# Patient Record
Sex: FEMALE | Race: ASIAN | Employment: UNEMPLOYED | URBAN - METROPOLITAN AREA
[De-identification: names, ages, dates, MRNs, and addresses within clinical notes are randomized per-mention and may not be internally consistent; named-entity substitution may affect disease eponyms.]

---

## 2018-02-10 ENCOUNTER — HOSPITAL ENCOUNTER (EMERGENCY)
Facility: HOSPITAL | Age: 43
Discharge: HOME | End: 2018-02-10
Attending: EMERGENCY MEDICINE
Payer: COMMERCIAL

## 2018-02-10 VITALS
BODY MASS INDEX: 30.51 KG/M2 | HEART RATE: 91 BPM | DIASTOLIC BLOOD PRESSURE: 98 MMHG | TEMPERATURE: 97 F | HEIGHT: 69 IN | OXYGEN SATURATION: 99 % | WEIGHT: 206 LBS | RESPIRATION RATE: 16 BRPM | SYSTOLIC BLOOD PRESSURE: 128 MMHG
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
## 2018-02-10 PROCEDURE — 99283 EMERGENCY DEPT VISIT LOW MDM: CPT

## 2018-02-10 RX ORDER — AMOXICILLIN AND CLAVULANATE POTASSIUM 875; 125 MG/1; MG/1
1 TABLET, FILM COATED ORAL 2 TIMES DAILY
Qty: 14 TABLET | Refills: 0 | Status: SHIPPED | OUTPATIENT
Start: 2018-02-10 | End: 2018-02-17

## 2018-02-11 NOTE — ED PROVIDER NOTES
Patient Seen in: [facility] Emergency Department    History   Patient presents with:  Bite (integumentary)    Stated Complaint: dog bite to lip    HPI    42-year-old female here concerned about the dog bite to the lip today.   She recently bought a p pulses. Pulmonary/Chest: Effort normal and breath sounds normal. No stridor. Abdominal: Soft. Bowel sounds are normal.   Neurological: Pt is alert and oriented to person, place, and time. Pt has normal reflexes. Skin: Skin is warm and dry.   There is

## 2018-02-11 NOTE — ED INITIAL ASSESSMENT (HPI)
Going for the string dangling from her sweatshirt, the pt's puppy missed, accidentally biting onto her bottom lip where the bleeding has since resolved. But the pt is concerned because the puppy hasn't received all of its shots yet.

## 2019-05-02 ENCOUNTER — TELEPHONE (OUTPATIENT)
Dept: INTERNAL MEDICINE CLINIC | Facility: CLINIC | Age: 44
End: 2019-05-02

## 2019-05-02 ENCOUNTER — OFFICE VISIT (OUTPATIENT)
Dept: INTERNAL MEDICINE CLINIC | Facility: CLINIC | Age: 44
End: 2019-05-02
Payer: COMMERCIAL

## 2019-05-02 VITALS
HEIGHT: 69 IN | DIASTOLIC BLOOD PRESSURE: 80 MMHG | TEMPERATURE: 98 F | WEIGHT: 209 LBS | HEART RATE: 84 BPM | BODY MASS INDEX: 30.96 KG/M2 | SYSTOLIC BLOOD PRESSURE: 122 MMHG

## 2019-05-02 DIAGNOSIS — IMO0001 UNCONTROLLED INSULIN DEPENDENT DIABETES MELLITUS: Primary | ICD-10-CM

## 2019-05-02 PROCEDURE — 99213 OFFICE O/P EST LOW 20 MIN: CPT | Performed by: INTERNAL MEDICINE

## 2019-05-02 NOTE — TELEPHONE ENCOUNTER
Spoke to pt to call Lakshmi at [facility] to give verbal release of DM Eye Exam to be faxed to AS for his review, faxed request also. Pt verbalizes understanding.

## 2019-05-02 NOTE — TELEPHONE ENCOUNTER
Message   Received:  Today   Message Contents   Carroll Huertas MD  [facility] Clinical Staff             [facility]

## 2019-05-03 ENCOUNTER — TELEPHONE (OUTPATIENT)
Dept: INTERNAL MEDICINE CLINIC | Facility: CLINIC | Age: 44
End: 2019-05-03

## 2019-05-03 NOTE — TELEPHONE ENCOUNTER
Patient states she is coming in to the lab here our office at about 9am, scheduled appt with Joey PAULINO for 5/14/19 at 1:30pm.  Pt verbalizes understanding. FYI to AS.

## 2019-05-03 NOTE — PROGRESS NOTES
Patient presents with:  Establish Care: LG. Room 8. Diabetes follow up. Last hga1c was about almost a year ago and it was 12.1      HPI:  Here for establish care, non commpliant diabetic, last a1c 12.  Here for establish care, states ready to control sugars visual inspection, pedal pulses present    A/P:    Uncontrolled insulin dependent diabetes mellitus (hcc)  (primary encounter diagnosis)  Had eye exam and [facility].    Referred to dm ed and fu with polly or ghada  Pt did not do labs yesterday as she s

## 2019-05-03 NOTE — TELEPHONE ENCOUNTER
Message   Received: Today   Message Contents   Nadine Mcdermott MD  [facility] Clinical Staff             Pt did not do labs, call her and make sure she folows up with that and with dm clinic.  We will only fill insulin month to month for her, historically no

## 2019-05-04 ENCOUNTER — LAB ENCOUNTER (OUTPATIENT)
Dept: LAB | Age: 44
End: 2019-05-04
Attending: INTERNAL MEDICINE
Payer: COMMERCIAL

## 2019-05-04 DIAGNOSIS — IMO0001 UNCONTROLLED INSULIN DEPENDENT DIABETES MELLITUS: ICD-10-CM

## 2019-05-04 PROCEDURE — 80053 COMPREHEN METABOLIC PANEL: CPT

## 2019-05-04 PROCEDURE — 80061 LIPID PANEL: CPT

## 2019-05-04 PROCEDURE — 84439 ASSAY OF FREE THYROXINE: CPT

## 2019-05-04 PROCEDURE — 84443 ASSAY THYROID STIM HORMONE: CPT

## 2019-05-04 PROCEDURE — 85025 COMPLETE CBC W/AUTO DIFF WBC: CPT

## 2019-05-04 PROCEDURE — 83036 HEMOGLOBIN GLYCOSYLATED A1C: CPT

## 2019-05-14 ENCOUNTER — OFFICE VISIT (OUTPATIENT)
Dept: ENDOCRINOLOGY CLINIC | Facility: CLINIC | Age: 44
End: 2019-05-14
Payer: COMMERCIAL

## 2019-05-14 VITALS
HEIGHT: 69 IN | HEART RATE: 86 BPM | RESPIRATION RATE: 20 BRPM | SYSTOLIC BLOOD PRESSURE: 120 MMHG | BODY MASS INDEX: 31.25 KG/M2 | WEIGHT: 211 LBS | DIASTOLIC BLOOD PRESSURE: 88 MMHG

## 2019-05-14 DIAGNOSIS — IMO0001 UNCONTROLLED INSULIN DEPENDENT DIABETES MELLITUS: Primary | ICD-10-CM

## 2019-05-14 PROBLEM — E78.5 HYPERLIPIDEMIA: Status: ACTIVE | Noted: 2017-12-06

## 2019-05-14 PROBLEM — L71.9 ACNE ROSACEA: Status: ACTIVE | Noted: 2019-05-14

## 2019-05-14 PROBLEM — I10 HYPERTENSION: Status: ACTIVE | Noted: 2019-05-14

## 2019-05-14 PROCEDURE — 95250 CONT GLUC MNTR PHYS/QHP EQP: CPT | Performed by: NURSE PRACTITIONER

## 2019-05-14 PROCEDURE — 99214 OFFICE O/P EST MOD 30 MIN: CPT | Performed by: NURSE PRACTITIONER

## 2019-05-14 RX ORDER — METFORMIN HYDROCHLORIDE 500 MG/1
500 TABLET, EXTENDED RELEASE ORAL DAILY
Qty: 120 TABLET | Refills: 1 | Status: SHIPPED | OUTPATIENT
Start: 2019-05-14 | End: 2020-12-03

## 2019-05-14 NOTE — ASSESSMENT & PLAN NOTE
5- 2019 labs per PCP  Not on statin   Per ADA, recommend statin rx : 4.2% 10 year CAD risk   Will discuss at follow up

## 2019-05-14 NOTE — PATIENT INSTRUCTIONS
We are here to support you with Diabetes but please remember that you still need your primary care doctor for your routine health maintenance.    Your A1C: 12.9%   This is too high for you and we will work with  You on improving your health   This test prov anytime you develop nausea, vomiting or diarrhea : you can decrease back down on dose or call me to discuss symptoms     Metformin increases your insulin sensitivity which means it will help the insulin in your body work more effectively.    It is very diff want to try the device: let me know     Always bring your glucose meter or blood sugar logbook to every appointment here at the diabetes center. This allows me to safely make adjustments to your diabetes plan.    In order for me to determine any patterns i

## 2019-05-14 NOTE — PROGRESS NOTES
Kate Crow is a 44year old female who presents today to establish for insulin dependent diabetes management.    Primary care physician: Jose Ramos MD   She presents today after being referred due to poorly controlled DM over past 3m per A1c 12.9% no  Nephropathy: not screened     Macrovascular:  PVD: no  CAD: no  Stroke/CVA: no    Modifying factors:  Medication adherence: yes but recently   Nutrition  Does not carb control. Has sweet tooth.     Exercise: 10 min daily   Recent steroids, illness or in Eyes: Negative for visual disturbance. Respiratory: Positive for shortness of breath. W exertion    Cardiovascular: Negative for chest pain. Gastrointestinal: Negative for nausea, abdominal pain and constipation.    Endocrine: Positive for irina glucose control to prevent onset /progression of DM complications.    Discussed with patient that the risk of developing irreversible microvascular complications (blindness, kidney problems, nerve problems, etc) is 15% higher  for every 1% increment A1c >7% loss discussed. Reviewed hypoglycemia signs/symptoms, treatment using the Rule of 15' and when to call DM center .   Continue site rotation to avoid lipodystrophy     The patient is asked to return in 1-2 weeks but recommended to contact DM clinic sooner i spent counseling and coordinating care related to their office visit.

## 2019-05-14 NOTE — ASSESSMENT & PLAN NOTE
A1C :   Lab Results   Component Value Date     (H) 05/04/2019    A1C 12.9 (H) 05/04/2019     Weight: 211 lb   Diabetes control is poor .   Recommendations:   Continue Lantus 30 units once daily   NOVOLOG:   If carb controlling, decrease to 10 Breakfa .  Continue site rotation to avoid lipodystrophy     The patient is asked to return in 1-2 weeks but recommended to contact DM clinic sooner if questions or concerns. The patient indicates understanding of these issues and agrees to the plan.

## 2019-05-16 ENCOUNTER — TELEPHONE (OUTPATIENT)
Dept: INTERNAL MEDICINE CLINIC | Facility: CLINIC | Age: 44
End: 2019-05-16

## 2019-05-17 NOTE — TELEPHONE ENCOUNTER
She did leave a specimen in lab bathroom before she left. Urine cup had her name and date of birth on it.    Can we confirm tami benito or Noble Croft

## 2019-05-17 NOTE — TELEPHONE ENCOUNTER
D/w CAB. [id number] Lavonne Zuniga for pt to give another urine sample at next OV on 5/28/19. Called and apologized to the pt as the specimen was likely misplaced. She understands and will complete at next appt. Pt had questions for AS re: lipids too.     AS- Pt is asking re: l

## 2019-05-21 NOTE — TELEPHONE ENCOUNTER
Called pt back, scheduled f/u appt with AS to discuss lipid results for 5/28 at 4:30 pm per pt request.  Pt verbalized understanding.

## 2019-05-30 ENCOUNTER — OFFICE VISIT (OUTPATIENT)
Dept: INTERNAL MEDICINE CLINIC | Facility: CLINIC | Age: 44
End: 2019-05-30
Payer: COMMERCIAL

## 2019-05-30 VITALS
WEIGHT: 213.19 LBS | RESPIRATION RATE: 16 BRPM | SYSTOLIC BLOOD PRESSURE: 124 MMHG | BODY MASS INDEX: 31.58 KG/M2 | DIASTOLIC BLOOD PRESSURE: 78 MMHG | HEART RATE: 96 BPM | HEIGHT: 69 IN

## 2019-05-30 DIAGNOSIS — Z12.31 VISIT FOR SCREENING MAMMOGRAM: ICD-10-CM

## 2019-05-30 DIAGNOSIS — I10 ESSENTIAL HYPERTENSION: ICD-10-CM

## 2019-05-30 DIAGNOSIS — E78.00 PURE HYPERCHOLESTEROLEMIA: ICD-10-CM

## 2019-05-30 DIAGNOSIS — IMO0001 UNCONTROLLED INSULIN DEPENDENT DIABETES MELLITUS: Primary | ICD-10-CM

## 2019-05-30 PROCEDURE — 99214 OFFICE O/P EST MOD 30 MIN: CPT | Performed by: INTERNAL MEDICINE

## 2019-05-30 RX ORDER — ATORVASTATIN CALCIUM 20 MG/1
20 TABLET, FILM COATED ORAL NIGHTLY
Qty: 90 TABLET | Refills: 1 | Status: SHIPPED | OUTPATIENT
Start: 2019-05-30 | End: 2020-03-17 | Stop reason: DRUGHIGH

## 2019-05-31 NOTE — PROGRESS NOTES
Patient presents with: Follow - Up: cn room 9: discuss test results       HPI:  Here for f/u htn, high chol, start statin today. Pt states cgms fell off, barely checking sugars, but down in 200's now. Notes mild nausea with victoza. bp stable on meds. gallops. Pulmonary/Chest: Effort normal and breath sounds normal. No respiratory distress. Abdominal: Soft. Bowel sounds are normal. Non tender, no masses, no organomegaly or hernias.   Musculoskeletal: No edema  Diabetic foot exam: Normal barefoot bilat

## 2019-06-19 ENCOUNTER — MED REC SCAN ONLY (OUTPATIENT)
Dept: INTERNAL MEDICINE CLINIC | Facility: CLINIC | Age: 44
End: 2019-06-19

## 2019-07-31 ENCOUNTER — OFFICE VISIT (OUTPATIENT)
Dept: ENDOCRINOLOGY CLINIC | Facility: CLINIC | Age: 44
End: 2019-07-31
Payer: COMMERCIAL

## 2019-07-31 VITALS
DIASTOLIC BLOOD PRESSURE: 78 MMHG | HEART RATE: 104 BPM | HEIGHT: 69 IN | WEIGHT: 217 LBS | SYSTOLIC BLOOD PRESSURE: 114 MMHG | BODY MASS INDEX: 32.14 KG/M2

## 2019-07-31 DIAGNOSIS — IMO0001 UNCONTROLLED INSULIN DEPENDENT DIABETES MELLITUS: Primary | ICD-10-CM

## 2019-07-31 DIAGNOSIS — E78.5 HYPERLIPIDEMIA, UNSPECIFIED HYPERLIPIDEMIA TYPE: ICD-10-CM

## 2019-07-31 PROBLEM — Z00.00 HEALTHCARE MAINTENANCE: Status: ACTIVE | Noted: 2019-07-31

## 2019-07-31 LAB
CARTRIDGE LOT#: 219 NUMERIC
GLUCOSE BLOOD: 464
HEMOGLOBIN A1C: 11 % (ref 4.3–5.6)
TEST STRIP LOT #: NORMAL NUMERIC

## 2019-07-31 PROCEDURE — 83036 HEMOGLOBIN GLYCOSYLATED A1C: CPT | Performed by: NURSE PRACTITIONER

## 2019-07-31 PROCEDURE — 82962 GLUCOSE BLOOD TEST: CPT | Performed by: NURSE PRACTITIONER

## 2019-07-31 PROCEDURE — 99214 OFFICE O/P EST MOD 30 MIN: CPT | Performed by: NURSE PRACTITIONER

## 2019-07-31 RX ORDER — BLOOD-GLUCOSE TRANSMITTER
EACH MISCELLANEOUS
Qty: 1 EACH | Refills: 3 | Status: SHIPPED | OUTPATIENT
Start: 2019-07-31 | End: 2020-10-02 | Stop reason: ALTCHOICE

## 2019-07-31 RX ORDER — BLOOD-GLUCOSE SENSOR
1 EACH MISCELLANEOUS
Qty: 3 EACH | Refills: 12 | Status: SHIPPED | OUTPATIENT
Start: 2019-07-31 | End: 2020-10-02 | Stop reason: ALTCHOICE

## 2019-07-31 RX ORDER — DOXYCYCLINE HYCLATE 100 MG/1
CAPSULE ORAL
COMMUNITY
Start: 2019-07-18

## 2019-07-31 RX ORDER — BLOOD-GLUCOSE,RECEIVER,CONT
1 EACH MISCELLANEOUS ONCE
Qty: 1 DEVICE | Refills: 0 | Status: SHIPPED | OUTPATIENT
Start: 2019-07-31 | End: 2019-07-31

## 2019-07-31 NOTE — PATIENT INSTRUCTIONS
A1C 11.0%   This is down slightly from 12.9%  We will continue to work on medication adjustments to improve your health      Continue :  Metformin XR 500m tab twice daily  Try increasing to 2 tabs in AM , 1 tab in PM   After your antibiotics, increase iPhone 7 and later (running iOS 11 and later)  FreeStyle LibreLink nathan is currently not available for Android users in the [address] [place] [address].     If you are uploading your reader with a cable at home or using the [employer] nathan, You will have the ability to share your Libr

## 2019-07-31 NOTE — PROGRESS NOTES
Asuncion Yen is a 38year old female who presents today to establish for insulin dependent diabetes management.    Primary care physician: Piper Chandler MD   In the past 3m her DM control has slightly improved to 11.0%  ( last A1C 12.9%) but still remai GFRNAA 94 05/04/2019    GFRAA 108 05/04/2019     Component      Latest Ref Rng & Units 5/30/2019   POC Albumin, UR      0 - 19 MG/DL 10   POC Creatinine,UR      10 - 300 MG/DL 50   POC ALBUMIN/CREAT RATIO, UR      0 - 29 MG/G <30       SMBG patterns: me Tablet 24 Hr Take 1 tablet (500 mg total) by mouth daily.  Disp: 120 tablet Rfl: 1   Liraglutide (VICTOZA) 18 MG/3ML Subcutaneous Solution Pen-injector Inject 1.8mg once daily Disp: 9 mL Rfl: 1   insulin glargine (LANTUS SOLOSTAR) 100 UNIT/ML Subcutaneous S 2019 labs per PCP  Taking Atorvastatin rx   Update labs       Uncontrolled insulin dependent diabetes mellitus ([facility] [address]  A1C : 11.0% (down from 12.9%)   Weight: 217 lb (last weight: 211 lb)   Diabetes control is poor .   Again reminded about poor glycemic cont as above  Lipids: update labs; started on atorvastatin rx   Dentist: recommended every 6 months      The risks and benefits of my recommendations, as well as other treatment options were discussed with the patient today.  questions were also answered to the

## 2019-09-09 ENCOUNTER — TELEPHONE (OUTPATIENT)
Dept: ENDOCRINOLOGY CLINIC | Facility: CLINIC | Age: 44
End: 2019-09-09

## 2019-09-23 ENCOUNTER — OFFICE VISIT (OUTPATIENT)
Dept: ENDOCRINOLOGY CLINIC | Facility: CLINIC | Age: 44
End: 2019-09-23
Payer: COMMERCIAL

## 2019-09-23 ENCOUNTER — TELEPHONE (OUTPATIENT)
Dept: INTERNAL MEDICINE CLINIC | Facility: CLINIC | Age: 44
End: 2019-09-23

## 2019-09-23 VITALS
BODY MASS INDEX: 31.99 KG/M2 | WEIGHT: 216 LBS | SYSTOLIC BLOOD PRESSURE: 128 MMHG | HEART RATE: 90 BPM | HEIGHT: 69 IN | RESPIRATION RATE: 20 BRPM | DIASTOLIC BLOOD PRESSURE: 88 MMHG

## 2019-09-23 DIAGNOSIS — IMO0001 UNCONTROLLED INSULIN DEPENDENT DIABETES MELLITUS: Primary | ICD-10-CM

## 2019-09-23 LAB
GLUCOSE BLOOD: 334
TEST STRIP LOT #: NORMAL NUMERIC

## 2019-09-23 PROCEDURE — 82962 GLUCOSE BLOOD TEST: CPT | Performed by: NURSE PRACTITIONER

## 2019-09-23 PROCEDURE — 99214 OFFICE O/P EST MOD 30 MIN: CPT | Performed by: NURSE PRACTITIONER

## 2019-09-23 RX ORDER — FLASH GLUCOSE SENSOR
KIT MISCELLANEOUS
Qty: 2 EACH | Refills: 1 | Status: SHIPPED | OUTPATIENT
Start: 2019-09-23 | End: 2020-05-20

## 2019-09-23 RX ORDER — FLASH GLUCOSE SENSOR
KIT MISCELLANEOUS
Qty: 1 DEVICE | Refills: 0 | Status: SHIPPED | OUTPATIENT
Start: 2019-09-23

## 2019-09-23 NOTE — PROGRESS NOTES
Kate Crow is a 38year old female who presents today for diabetes management.    Primary care physician: Jose Ramos MD   In the past 6 weeks her BG control has remained uncontrolled  BG today is 334 mg/dl   At last visit, basal insulin was resumed symptoms:   Polyuria, polyphagia, polydipsia: no  Paresthesias: no  Blurred vision: no  Hypoglycemia:no     DM Complications:  Microvascular:   Neuropathy: no  Retinopathy: no  Nephropathy: neg urine m/alb  5-2019     Macrovascular:  PVD: no  CAD: no  Dimple Benton Solution Pen-injector 30 units once daily Disp: 30 mL Rfl: 1   Insulin Aspart Pen (NOVOLOG FLEXPEN) 100 UNIT/ML Subcutaneous Solution Pen-injector 50 units daily as directed (Patient taking differently: 20 units twice daily as directed ) Disp: 15 mL Rfl: 1 person, place, and time. She appears well-developed and well-nourished. No distress. Cardiovascular: Normal rate. Pulmonary/Chest: Effort normal.   Neurological: She is alert and oriented to person, place, and time.    Skin: + face rash   Psychiatric: reported above  Retinopathy Screen: No DR 5- (in media )   Nephropathy Screen:  NEG 5-2019    Depression Screen:  PHQ-2 NEG 5-3-2019   Feet exam: Reviewed feet precautions 5-2-2019  BP: as above  Lipids: update labs; started on atorvastatin rx   Bonnita Coffee

## 2019-09-23 NOTE — PATIENT INSTRUCTIONS
Your trends are still too high       Tresiba  Increase to 34 units once daily     NOVOLO units at Breakfast  26  w dinner meal     Victoza :  Try to increase dose to 1.2mg once daily     Metformin XR 500m tab twice daily    If your sugar is highe

## 2019-09-24 NOTE — TELEPHONE ENCOUNTER
[employer] back and said. ... I looked into the account here with us and it seems that we have made attempts to get ahold of this patient on 8/1/2019 as well as on 8/21/2019. We have called and sent emails due to the voicemail box being full.  This

## 2019-09-24 NOTE — TELEPHONE ENCOUNTER
Called to check the status of the dexcom per CAB from 7/31/19 stated that not cover thru pharmacy benefits so it was return to dexcom will message alex michelle to check thru his end.

## 2019-10-15 ENCOUNTER — TELEPHONE (OUTPATIENT)
Dept: INTERNAL MEDICINE CLINIC | Facility: CLINIC | Age: 44
End: 2019-10-15

## 2019-10-15 NOTE — TELEPHONE ENCOUNTER
Spoke to pt and scheduled appt   Future Appointments   Date Time Provider Lazaro Taya   11/5/2019  3:15 PM MARTINEZ Bowers [facility]

## 2019-11-11 DIAGNOSIS — IMO0001 UNCONTROLLED INSULIN DEPENDENT DIABETES MELLITUS: ICD-10-CM

## 2019-11-12 RX ORDER — INSULIN LISPRO 100 [IU]/ML
INJECTION, SOLUTION INTRAVENOUS; SUBCUTANEOUS
Refills: 0 | Status: CANCELLED | OUTPATIENT
Start: 2019-11-12

## 2019-11-12 RX ORDER — INSULIN LISPRO 100 [IU]/ML
50 INJECTION, SOLUTION INTRAVENOUS; SUBCUTANEOUS DAILY
Qty: 15 ML | Refills: 0 | Status: SHIPPED | OUTPATIENT
Start: 2019-11-12 | End: 2020-02-05

## 2019-11-21 ENCOUNTER — TELEPHONE (OUTPATIENT)
Dept: INTERNAL MEDICINE CLINIC | Facility: CLINIC | Age: 44
End: 2019-11-21

## 2019-11-21 NOTE — TELEPHONE ENCOUNTER
FW:   Received: Ric Chavez MD  [facility] Clinical Staff Cc: MARTINEZ Evans             Discharge process    Previous Messages      ----- Message -----   From: MARTINEZ Evans   Sent: 11/20/2019   8:14 AM

## 2019-11-22 NOTE — TELEPHONE ENCOUNTER
Spoke with Ally Reynolds and Gary Lynn regarding this patient and they did approve thje dismissal. She has been sent a dismissl letter, one regular mail and one certified.   I tried to call her to let her know she was bring dismissed but her voice mail is

## 2019-12-27 DIAGNOSIS — IMO0001 UNCONTROLLED INSULIN DEPENDENT DIABETES MELLITUS: ICD-10-CM

## 2019-12-27 RX ORDER — INSULIN ASPART 100 [IU]/ML
INJECTION, SOLUTION INTRAVENOUS; SUBCUTANEOUS
Qty: 15 ML | Refills: 1 | OUTPATIENT
Start: 2019-12-27

## 2019-12-27 RX ORDER — INSULIN ASPART 100 [IU]/ML
INJECTION, SOLUTION INTRAVENOUS; SUBCUTANEOUS
Qty: 15 ML | Refills: 0 | OUTPATIENT
Start: 2019-12-27

## 2020-02-05 ENCOUNTER — OFFICE VISIT (OUTPATIENT)
Dept: INTERNAL MEDICINE CLINIC | Facility: CLINIC | Age: 45
End: 2020-02-05
Payer: COMMERCIAL

## 2020-02-05 VITALS
HEART RATE: 72 BPM | BODY MASS INDEX: 32.14 KG/M2 | SYSTOLIC BLOOD PRESSURE: 118 MMHG | HEIGHT: 69 IN | WEIGHT: 217 LBS | DIASTOLIC BLOOD PRESSURE: 72 MMHG | RESPIRATION RATE: 16 BRPM | TEMPERATURE: 98 F

## 2020-02-05 DIAGNOSIS — Z23 NEED FOR INFLUENZA VACCINATION: ICD-10-CM

## 2020-02-05 DIAGNOSIS — IMO0001 UNCONTROLLED INSULIN DEPENDENT DIABETES MELLITUS: Primary | ICD-10-CM

## 2020-02-05 DIAGNOSIS — E78.5 HYPERLIPIDEMIA, UNSPECIFIED HYPERLIPIDEMIA TYPE: ICD-10-CM

## 2020-02-05 DIAGNOSIS — L81.9 HYPERPIGMENTATION OF SKIN: ICD-10-CM

## 2020-02-05 PROCEDURE — 90471 IMMUNIZATION ADMIN: CPT | Performed by: PHYSICIAN ASSISTANT

## 2020-02-05 PROCEDURE — 99214 OFFICE O/P EST MOD 30 MIN: CPT | Performed by: PHYSICIAN ASSISTANT

## 2020-02-05 PROCEDURE — 90686 IIV4 VACC NO PRSV 0.5 ML IM: CPT | Performed by: PHYSICIAN ASSISTANT

## 2020-02-05 RX ORDER — INSULIN ASPART 100 [IU]/ML
INJECTION, SOLUTION INTRAVENOUS; SUBCUTANEOUS
Refills: 0 | COMMUNITY
Start: 2019-11-12 | End: 2020-02-18

## 2020-02-05 NOTE — PROGRESS NOTES
Patient presents with:  Diabetes: DD rm 2, Diabetes Management. Itching Palms, Back. Skin discoloration. PM Fatigue. Liver/Heart bloodwork. HPI:   Pt presents with concern about her diabetes as well as dark discoloration on her back and itchy palms. • atorvastatin 20 MG Oral Tab Take 1 tablet (20 mg total) by mouth nightly. 90 tablet 1   • metFORMIN HCl  MG Oral Tablet 24 Hr Take 1 tablet (500 mg total) by mouth daily.  120 tablet 1   • Liraglutide (VICTOZA) 18 MG/3ML Subcutaneous Solution P today.  Hyperlipidemia, unspecified hyperlipidemia type - Check labs, encouraged compliance. Working on diet.     Orders Placed This Encounter      CMP now      Lipids now      Hemoglobin A1C now      Microalb/Creat Ratio, now      Flulaval 6 months and ol

## 2020-02-12 RX ORDER — INSULIN ASPART 100 [IU]/ML
INJECTION, SOLUTION INTRAVENOUS; SUBCUTANEOUS
Qty: 15 ML | Refills: 0 | OUTPATIENT
Start: 2020-02-12

## 2020-02-18 ENCOUNTER — TELEPHONE (OUTPATIENT)
Dept: INTERNAL MEDICINE CLINIC | Facility: CLINIC | Age: 45
End: 2020-02-18

## 2020-02-18 RX ORDER — INSULIN ASPART 100 [IU]/ML
INJECTION, SOLUTION INTRAVENOUS; SUBCUTANEOUS
Qty: 1 PEN | Refills: 1 | Status: SHIPPED | OUTPATIENT
Start: 2020-02-18 | End: 2020-03-21

## 2020-02-18 NOTE — TELEPHONE ENCOUNTER
Last VISIT 02/05/20    Last REFILL 11/12/19     Last LABS 07/31/19 A1c done    Future Appointments   Date Time Provider Lazaro Bain   3/13/2020  3:00 PM Radha Lieberman MD [facility]         Please Approve or Deny.

## 2020-03-16 NOTE — TELEPHONE ENCOUNTER
Future Appointments   Date Time Provider Lazaro Bain   4/17/2020  9:45 AM Quin Holland MD [facility]     Pt will complete labs, Pt ask the pharmacy for a refill. Will call back at the end of the week to discuss lab results.

## 2020-03-17 ENCOUNTER — TELEPHONE (OUTPATIENT)
Dept: INTERNAL MEDICINE CLINIC | Facility: CLINIC | Age: 45
End: 2020-03-17

## 2020-03-17 ENCOUNTER — LAB ENCOUNTER (OUTPATIENT)
Dept: LAB | Age: 45
End: 2020-03-17
Attending: PHYSICIAN ASSISTANT
Payer: COMMERCIAL

## 2020-03-17 DIAGNOSIS — R10.2 PELVIC PRESSURE IN FEMALE: Primary | ICD-10-CM

## 2020-03-17 DIAGNOSIS — IMO0001 UNCONTROLLED INSULIN DEPENDENT DIABETES MELLITUS: ICD-10-CM

## 2020-03-17 DIAGNOSIS — R10.2 PELVIC PRESSURE IN FEMALE: ICD-10-CM

## 2020-03-17 LAB
ALBUMIN SERPL-MCNC: 3.5 G/DL (ref 3.4–5)
ALBUMIN/GLOB SERPL: 0.8 {RATIO} (ref 1–2)
ALP LIVER SERPL-CCNC: 88 U/L (ref 37–98)
ALT SERPL-CCNC: 30 U/L (ref 13–56)
ANION GAP SERPL CALC-SCNC: 6 MMOL/L (ref 0–18)
AST SERPL-CCNC: 18 U/L (ref 15–37)
BILIRUB SERPL-MCNC: 0.6 MG/DL (ref 0.1–2)
BILIRUB UR QL STRIP.AUTO: NEGATIVE
BUN BLD-MCNC: 10 MG/DL (ref 7–18)
BUN/CREAT SERPL: 11.8 (ref 10–20)
CALCIUM BLD-MCNC: 9 MG/DL (ref 8.5–10.1)
CHLORIDE SERPL-SCNC: 100 MMOL/L (ref 98–112)
CHOLEST SMN-MCNC: 219 MG/DL (ref ?–200)
CLARITY UR REFRACT.AUTO: CLEAR
CO2 SERPL-SCNC: 27 MMOL/L (ref 21–32)
COLOR UR AUTO: YELLOW
CREAT BLD-MCNC: 0.85 MG/DL (ref 0.55–1.02)
CREAT UR-SCNC: 65.1 MG/DL
EST. AVERAGE GLUCOSE BLD GHB EST-MCNC: 361 MG/DL (ref 68–126)
GLOBULIN PLAS-MCNC: 4.4 G/DL (ref 2.8–4.4)
GLUCOSE BLD-MCNC: 333 MG/DL (ref 70–99)
GLUCOSE UR STRIP.AUTO-MCNC: >=500 MG/DL
HBA1C MFR BLD HPLC: 14.2 % (ref ?–5.7)
HDLC SERPL-MCNC: 42 MG/DL (ref 40–59)
KETONES UR STRIP.AUTO-MCNC: NEGATIVE MG/DL
LDLC SERPL CALC-MCNC: 136 MG/DL (ref ?–100)
M PROTEIN MFR SERPL ELPH: 7.9 G/DL (ref 6.4–8.2)
MICROALBUMIN UR-MCNC: 0.56 MG/DL
MICROALBUMIN/CREAT 24H UR-RTO: 8.6 UG/MG (ref ?–30)
NITRITE UR QL STRIP.AUTO: NEGATIVE
NONHDLC SERPL-MCNC: 177 MG/DL (ref ?–130)
OSMOLALITY SERPL CALC.SUM OF ELEC: 288 MOSM/KG (ref 275–295)
PATIENT FASTING Y/N/NP: YES
PATIENT FASTING Y/N/NP: YES
PH UR STRIP.AUTO: 6 [PH] (ref 4.5–8)
POTASSIUM SERPL-SCNC: 3.8 MMOL/L (ref 3.5–5.1)
PROT UR STRIP.AUTO-MCNC: NEGATIVE MG/DL
RBC UR QL AUTO: NEGATIVE
SODIUM SERPL-SCNC: 133 MMOL/L (ref 136–145)
SP GR UR STRIP.AUTO: 1.03 (ref 1–1.03)
TRIGL SERPL-MCNC: 204 MG/DL (ref 30–149)
UROBILINOGEN UR STRIP.AUTO-MCNC: <2 MG/DL
VLDLC SERPL CALC-MCNC: 41 MG/DL (ref 0–30)

## 2020-03-17 PROCEDURE — 81001 URINALYSIS AUTO W/SCOPE: CPT | Performed by: INTERNAL MEDICINE

## 2020-03-17 PROCEDURE — 80061 LIPID PANEL: CPT | Performed by: PHYSICIAN ASSISTANT

## 2020-03-17 PROCEDURE — 80053 COMPREHEN METABOLIC PANEL: CPT | Performed by: PHYSICIAN ASSISTANT

## 2020-03-17 PROCEDURE — 82043 UR ALBUMIN QUANTITATIVE: CPT | Performed by: PHYSICIAN ASSISTANT

## 2020-03-17 PROCEDURE — 82570 ASSAY OF URINE CREATININE: CPT | Performed by: PHYSICIAN ASSISTANT

## 2020-03-17 PROCEDURE — 83036 HEMOGLOBIN GLYCOSYLATED A1C: CPT | Performed by: PHYSICIAN ASSISTANT

## 2020-03-17 RX ORDER — ATORVASTATIN CALCIUM 40 MG/1
40 TABLET, FILM COATED ORAL NIGHTLY
Qty: 90 TABLET | Refills: 0 | Status: SHIPPED | OUTPATIENT
Start: 2020-03-17 | End: 2020-09-21

## 2020-03-17 RX ORDER — SULFAMETHOXAZOLE AND TRIMETHOPRIM 800; 160 MG/1; MG/1
1 TABLET ORAL 2 TIMES DAILY
Qty: 14 TABLET | Refills: 0 | Status: SHIPPED | OUTPATIENT
Start: 2020-03-17 | End: 2020-10-02 | Stop reason: ALTCHOICE

## 2020-03-17 NOTE — PROGRESS NOTES
A1c is even higher. Very poor control. Has pt used the CGM she mentioned at last visit yet. Is she taking meds as prescribed? Chol is high. She needs to increase her Atorvastatin to 40 mg PO daily (I sent Rx).   She has f/u with AS next month (martias

## 2020-03-17 NOTE — TELEPHONE ENCOUNTER
Patient indicates she went to see her OB/GYN for irritation on the outside of her vagina for 2 weeks, GYNE gave her a cream in case this symptom was from a yeast infection but the cream did not help.  Pt states in the last week she has lower pelvic and low

## 2020-03-20 ENCOUNTER — TELEPHONE (OUTPATIENT)
Dept: INTERNAL MEDICINE CLINIC | Facility: CLINIC | Age: 45
End: 2020-03-20

## 2020-03-20 DIAGNOSIS — E11.65 INSULIN DEPENDENT TYPE 2 DIABETES MELLITUS, UNCONTROLLED (HCC): ICD-10-CM

## 2020-03-20 DIAGNOSIS — Z79.4 INSULIN DEPENDENT TYPE 2 DIABETES MELLITUS, UNCONTROLLED (HCC): ICD-10-CM

## 2020-03-20 DIAGNOSIS — N76.0 ACUTE VAGINITIS: Primary | ICD-10-CM

## 2020-03-20 PROCEDURE — 99442 PHONE E/M BY PHYS 11-20 MIN: CPT | Performed by: PHYSICIAN ASSISTANT

## 2020-03-20 NOTE — TELEPHONE ENCOUNTER
Patient calling about her urinalysis results, no culture was done.   Pt is still having irritation in her genital area and low pelvic discomfort but tolerable, wants to know if she should go for another test.    Pt states she will soon be out of Novolog and

## 2020-03-21 RX ORDER — FLUCONAZOLE 150 MG/1
150 TABLET ORAL ONCE
Qty: 1 TABLET | Refills: 0 | Status: SHIPPED | OUTPATIENT
Start: 2020-03-21 | End: 2020-03-21

## 2020-03-21 RX ORDER — INSULIN ASPART 100 [IU]/ML
INJECTION, SOLUTION INTRAVENOUS; SUBCUTANEOUS
Qty: 1 PEN | Refills: 1 | Status: SHIPPED | OUTPATIENT
Start: 2020-03-21 | End: 2020-04-08

## 2020-03-21 NOTE — TELEPHONE ENCOUNTER
Virtual/Telephone Check-In    Nelly Jones verbally consents to a Virtual/Telephone Check-In service on 03/21/20. Patient understands and accepts financial responsibility for any deductible, co-insurance and/or co-pays associated with this service.     D to see what is happening with COVID 19 and office procedures closer to the appt.       DM -

## 2020-03-26 NOTE — PROGRESS NOTES
I spoke with pt and did a phone visit on 3/20/20 due to some other issues as well, vaginal complaints (she called in). She has OV with 4/17/20 with AS, should that be changed to phone or video visit?

## 2020-04-08 RX ORDER — INSULIN ASPART 100 [IU]/ML
INJECTION, SOLUTION INTRAVENOUS; SUBCUTANEOUS
Qty: 3 ML | Refills: 0 | OUTPATIENT
Start: 2020-04-08

## 2020-04-08 RX ORDER — INSULIN ASPART 100 [IU]/ML
INJECTION, SOLUTION INTRAVENOUS; SUBCUTANEOUS
Qty: 5 PEN | Refills: 0 | Status: SHIPPED | OUTPATIENT
Start: 2020-04-08 | End: 2020-05-19

## 2020-04-08 NOTE — TELEPHONE ENCOUNTER
novolog flexpen     Last Ov:2/5/20  Upcoming appt: 4/15/20  Last labs: 3/17/20 CMP, lipid, A1C  Last Rx:3/21/20    Per Protocol sent for review

## 2020-04-17 ENCOUNTER — TELEMEDICINE (OUTPATIENT)
Dept: INTERNAL MEDICINE CLINIC | Facility: CLINIC | Age: 45
End: 2020-04-17
Payer: COMMERCIAL

## 2020-04-17 DIAGNOSIS — E78.5 HYPERLIPIDEMIA, UNSPECIFIED HYPERLIPIDEMIA TYPE: Primary | ICD-10-CM

## 2020-04-17 DIAGNOSIS — Z79.4 TYPE 2 DIABETES MELLITUS WITH HYPERGLYCEMIA, WITH LONG-TERM CURRENT USE OF INSULIN (HCC): ICD-10-CM

## 2020-04-17 DIAGNOSIS — E11.65 TYPE 2 DIABETES MELLITUS WITH HYPERGLYCEMIA, WITH LONG-TERM CURRENT USE OF INSULIN (HCC): ICD-10-CM

## 2020-04-17 DIAGNOSIS — Z12.31 VISIT FOR SCREENING MAMMOGRAM: ICD-10-CM

## 2020-04-17 PROCEDURE — 99214 OFFICE O/P EST MOD 30 MIN: CPT | Performed by: INTERNAL MEDICINE

## 2020-04-17 NOTE — PROGRESS NOTES
Due to COVID-19 ACTION PLAN, the patient's office visit was converted to a phone or video visit. Time Spent:30 min    Subjective     HPI:   Gm Colbert is a 38year old female who presents for high chol and uncontrolled dm2 on insulin and using cgms.  [facility] or call 911 for worsening symptoms or acute distress. Please note that the following visit was completed using two-way, real-time interactive audio and/or video communication.   This has been done in good fabrizio to provide continuity of care in the best

## 2020-05-18 RX ORDER — FLASH GLUCOSE SENSOR
KIT MISCELLANEOUS
Qty: 2 EACH | Refills: 1 | Status: CANCELLED | OUTPATIENT
Start: 2020-05-18

## 2020-05-18 NOTE — TELEPHONE ENCOUNTER
Last OV: 2/5/20 diabetic f/u    No future appointments. Latest labs: 3/17/20 Microalb, A1c, Lipid and CMP    Latest RX: Continuous Blood Gluc Sensor (FREESTYLE RYAN 14 DAY SENSOR) Does not apply Misc 2 each 1 refill on 9/23/19    Per protocol, passed.

## 2020-05-19 DIAGNOSIS — E11.65 INSULIN DEPENDENT TYPE 2 DIABETES MELLITUS, UNCONTROLLED (HCC): ICD-10-CM

## 2020-05-19 DIAGNOSIS — Z79.4 INSULIN DEPENDENT TYPE 2 DIABETES MELLITUS, UNCONTROLLED (HCC): ICD-10-CM

## 2020-05-19 RX ORDER — INSULIN ASPART 100 [IU]/ML
INJECTION, SOLUTION INTRAVENOUS; SUBCUTANEOUS
Qty: 5 PEN | Refills: 0 | Status: SHIPPED | OUTPATIENT
Start: 2020-05-19 | End: 2020-05-26

## 2020-05-19 NOTE — TELEPHONE ENCOUNTER
This was prescribed by Sincere Guardado who the patient no longer see's  Please send to [facility]  A new script for   Continuous Blood Gluc Sensor (FREESTYLE RYAN 14 DAY SENSOR) Does not apply Misc 2 each 1 9/23/2019    Sig:   Apply to skin every 14 days

## 2020-05-20 RX ORDER — INSULIN ASPART 100 [IU]/ML
INJECTION, SOLUTION INTRAVENOUS; SUBCUTANEOUS
Qty: 5 PEN | Refills: 0 | OUTPATIENT
Start: 2020-05-20

## 2020-05-20 RX ORDER — FLASH GLUCOSE SENSOR
KIT MISCELLANEOUS
Qty: 2 EACH | Refills: 1 | Status: SHIPPED | OUTPATIENT
Start: 2020-05-20 | End: 2020-07-13

## 2020-05-20 NOTE — TELEPHONE ENCOUNTER
Last Ov: 2/5/20, CB, DM F/u  Last labs: Microalb/creat, A1c, Lipid, CMP, UA w Ref 3/17/20  Last Rx: Freestyle Neisha 14 day sensor, #2, 1R 9/23/19    Future Appointments   Date Time Provider Lazaro Bain   5/26/2020  8:00 AM Garrison Barrios MD [facility]

## 2020-05-20 NOTE — TELEPHONE ENCOUNTER
Last Ov:2/5/20 CB  Upcoming appt:5/26/20 AS  Last labs:3/17/20 UA, cmp, lipid, A1C, microalb  Last Rx: 0/61/99 Trulicity    Per Protocol sent for review   last A1C <7.5

## 2020-05-26 ENCOUNTER — TELEMEDICINE (OUTPATIENT)
Dept: INTERNAL MEDICINE CLINIC | Facility: CLINIC | Age: 45
End: 2020-05-26

## 2020-05-26 ENCOUNTER — TELEPHONE (OUTPATIENT)
Dept: INTERNAL MEDICINE CLINIC | Facility: CLINIC | Age: 45
End: 2020-05-26

## 2020-05-26 DIAGNOSIS — E11.65 TYPE 2 DIABETES MELLITUS WITH HYPERGLYCEMIA, WITH LONG-TERM CURRENT USE OF INSULIN (HCC): Primary | ICD-10-CM

## 2020-05-26 DIAGNOSIS — Z79.4 TYPE 2 DIABETES MELLITUS WITH HYPERGLYCEMIA, WITH LONG-TERM CURRENT USE OF INSULIN (HCC): Primary | ICD-10-CM

## 2020-05-26 DIAGNOSIS — L71.9 ACNE ROSACEA: ICD-10-CM

## 2020-05-26 PROCEDURE — 99214 OFFICE O/P EST MOD 30 MIN: CPT | Performed by: INTERNAL MEDICINE

## 2020-05-26 RX ORDER — INSULIN ASPART 100 [IU]/ML
INJECTION, SOLUTION INTRAVENOUS; SUBCUTANEOUS
Qty: 5 PEN | Refills: 0 | COMMUNITY
Start: 2020-05-26 | End: 2020-07-07

## 2020-05-26 NOTE — PROGRESS NOTES
Due to COVID-19 ACTION PLAN, the patient's office visit was converted to a video visit. Time Spent:35 min    Subjective     HPI:   Irving Delgado is a 38year old female who presents for f/u of dm2, htn, rosacea.    Notes marked improvement in sugars sinc decision making. Appropriate medical decision-making and tests are ordered as detailed in the plan of care above.

## 2020-05-26 NOTE — TELEPHONE ENCOUNTER
Message   Received:  Today   Message Contents   Beena Kay MD  [facility] Clinical Staff             Please make a f/u appt with pt virtually, after June 17th

## 2020-06-08 NOTE — TELEPHONE ENCOUNTER
Future Appointments   Date Time Provider Lazaro Taya   6/24/2020 11:00 AM Elmer Lin MD [facility]

## 2020-06-25 ENCOUNTER — TELEPHONE (OUTPATIENT)
Dept: INTERNAL MEDICINE CLINIC | Facility: CLINIC | Age: 45
End: 2020-06-25

## 2020-06-25 NOTE — TELEPHONE ENCOUNTER
Received fax from [facility] with attached visit notes. Abstracted eye exam. Placed in AS bin for review.

## 2020-07-07 RX ORDER — DULAGLUTIDE 0.75 MG/.5ML
INJECTION, SOLUTION SUBCUTANEOUS
Qty: 2 ML | Refills: 0 | Status: SHIPPED | OUTPATIENT
Start: 2020-07-07 | End: 2020-08-18

## 2020-07-07 RX ORDER — INSULIN ASPART 100 [IU]/ML
INJECTION, SOLUTION INTRAVENOUS; SUBCUTANEOUS
Qty: 15 ML | Refills: 0 | Status: SHIPPED | OUTPATIENT
Start: 2020-07-07 | End: 2020-08-18

## 2020-07-07 NOTE — TELEPHONE ENCOUNTER
Last Ov: 5/26/20, AS, televisit (DM f/u)  Last labs: Microalb/creat, A1c, Lipid, CMP 3/17/20  Last Rx:  Novolog 100 iu/mL, #5 pen, 0R 6/18/79  Trulicity 3.50IW/6.2IB, #4 pen, 0R 5/20/20    Future Appointments   Date Time Provider Lazaro Bain   7/13/2

## 2020-07-13 RX ORDER — FLASH GLUCOSE SENSOR
KIT MISCELLANEOUS
Qty: 2 EACH | Refills: 1 | Status: SHIPPED | OUTPATIENT
Start: 2020-07-13 | End: 2020-08-18

## 2020-07-13 NOTE — TELEPHONE ENCOUNTER
Last VISIT 02/05/20    Last REFILL 05/20/20 qty 2 w/1 refill    Last LABS 03/17/20 CMp, Lipid.  A1c, Microalb/creat, UA done    Future Appointments   Date Time Provider Lazaro Bain   8/28/2020 11:15 AM Obdulia Romero MD [facility]

## 2020-08-18 DIAGNOSIS — Z79.4 INSULIN DEPENDENT TYPE 2 DIABETES MELLITUS, UNCONTROLLED (HCC): ICD-10-CM

## 2020-08-18 DIAGNOSIS — E11.65 INSULIN DEPENDENT TYPE 2 DIABETES MELLITUS, UNCONTROLLED (HCC): ICD-10-CM

## 2020-08-18 RX ORDER — FLASH GLUCOSE SENSOR
KIT MISCELLANEOUS
Qty: 2 EACH | Refills: 1 | Status: SHIPPED | OUTPATIENT
Start: 2020-08-18 | End: 2020-09-21

## 2020-08-18 RX ORDER — INSULIN ASPART 100 [IU]/ML
INJECTION, SOLUTION INTRAVENOUS; SUBCUTANEOUS
Qty: 15 ML | Refills: 0 | Status: SHIPPED | OUTPATIENT
Start: 2020-08-18 | End: 2020-09-21

## 2020-08-18 NOTE — TELEPHONE ENCOUNTER
Freestyle Neisha 14 day sensor-PASSED per protocol, refill sent.      Last OV 2.5.20 w/ CB (DM)   Last PE No recent PE on file   Last REFILL 7.7.20 Novolog Flexpen 100unit/mL 15mL 0R    0.2.10 Trulicity 6.29VO/7.4GB 2mL 0R    5.19.20 Lantus Solostar 100unit/

## 2020-09-05 ENCOUNTER — LAB ENCOUNTER (OUTPATIENT)
Dept: LAB | Age: 45
End: 2020-09-05
Attending: INTERNAL MEDICINE
Payer: COMMERCIAL

## 2020-09-05 DIAGNOSIS — E11.65 TYPE 2 DIABETES MELLITUS WITH HYPERGLYCEMIA, WITH LONG-TERM CURRENT USE OF INSULIN (HCC): ICD-10-CM

## 2020-09-05 DIAGNOSIS — Z79.4 TYPE 2 DIABETES MELLITUS WITH HYPERGLYCEMIA, WITH LONG-TERM CURRENT USE OF INSULIN (HCC): ICD-10-CM

## 2020-09-05 LAB
ALBUMIN SERPL-MCNC: 3.4 G/DL (ref 3.4–5)
ALBUMIN/GLOB SERPL: 0.8 {RATIO} (ref 1–2)
ALP LIVER SERPL-CCNC: 86 U/L (ref 37–98)
ALT SERPL-CCNC: 27 U/L (ref 13–56)
ANION GAP SERPL CALC-SCNC: 2 MMOL/L (ref 0–18)
AST SERPL-CCNC: 23 U/L (ref 15–37)
BILIRUB SERPL-MCNC: 0.6 MG/DL (ref 0.1–2)
BUN BLD-MCNC: 8 MG/DL (ref 7–18)
BUN/CREAT SERPL: 8.5 (ref 10–20)
CALCIUM BLD-MCNC: 9.4 MG/DL (ref 8.5–10.1)
CHLORIDE SERPL-SCNC: 105 MMOL/L (ref 98–112)
CHOLEST SMN-MCNC: 208 MG/DL (ref ?–200)
CO2 SERPL-SCNC: 29 MMOL/L (ref 21–32)
CREAT BLD-MCNC: 0.94 MG/DL (ref 0.55–1.02)
EST. AVERAGE GLUCOSE BLD GHB EST-MCNC: 275 MG/DL (ref 68–126)
GLOBULIN PLAS-MCNC: 4.3 G/DL (ref 2.8–4.4)
GLUCOSE BLD-MCNC: 151 MG/DL (ref 70–99)
HBA1C MFR BLD HPLC: 11.2 % (ref ?–5.7)
HDLC SERPL-MCNC: 43 MG/DL (ref 40–59)
LDLC SERPL CALC-MCNC: 127 MG/DL (ref ?–100)
M PROTEIN MFR SERPL ELPH: 7.7 G/DL (ref 6.4–8.2)
NONHDLC SERPL-MCNC: 165 MG/DL (ref ?–130)
OSMOLALITY SERPL CALC.SUM OF ELEC: 283 MOSM/KG (ref 275–295)
PATIENT FASTING Y/N/NP: YES
PATIENT FASTING Y/N/NP: YES
POTASSIUM SERPL-SCNC: 3.9 MMOL/L (ref 3.5–5.1)
SODIUM SERPL-SCNC: 136 MMOL/L (ref 136–145)
TRIGL SERPL-MCNC: 190 MG/DL (ref 30–149)
VLDLC SERPL CALC-MCNC: 38 MG/DL (ref 0–30)

## 2020-09-05 PROCEDURE — 80053 COMPREHEN METABOLIC PANEL: CPT

## 2020-09-05 PROCEDURE — 80061 LIPID PANEL: CPT

## 2020-09-05 PROCEDURE — 36415 COLL VENOUS BLD VENIPUNCTURE: CPT

## 2020-09-05 PROCEDURE — 83036 HEMOGLOBIN GLYCOSYLATED A1C: CPT

## 2020-09-10 DIAGNOSIS — Z79.4 TYPE 2 DIABETES MELLITUS WITH HYPERGLYCEMIA, WITH LONG-TERM CURRENT USE OF INSULIN (HCC): Primary | ICD-10-CM

## 2020-09-10 DIAGNOSIS — E11.65 TYPE 2 DIABETES MELLITUS WITH HYPERGLYCEMIA, WITH LONG-TERM CURRENT USE OF INSULIN (HCC): Primary | ICD-10-CM

## 2020-09-21 DIAGNOSIS — Z79.4 INSULIN DEPENDENT TYPE 2 DIABETES MELLITUS, UNCONTROLLED (HCC): ICD-10-CM

## 2020-09-21 DIAGNOSIS — E11.65 INSULIN DEPENDENT TYPE 2 DIABETES MELLITUS, UNCONTROLLED (HCC): ICD-10-CM

## 2020-09-21 RX ORDER — ATORVASTATIN CALCIUM 40 MG/1
40 TABLET, FILM COATED ORAL NIGHTLY
Qty: 90 TABLET | Refills: 0 | Status: SHIPPED | OUTPATIENT
Start: 2020-09-21 | End: 2021-01-22

## 2020-09-21 NOTE — TELEPHONE ENCOUNTER
Last VISIT 02/05/20    Last REFILL 03/17/20 qty 90 w/0 refills    Last LABS 09/05/20 Lipid, CMP, A1c done    Future Appointments   Date Time Provider Lazaro Bain   9/22/2020  2:15 PM Gissell Hooker RN [address] [place] [address] DIABETES [facility]   9/23/2020  2:30 PM Tri

## 2020-09-22 ENCOUNTER — HOSPITAL ENCOUNTER (OUTPATIENT)
Dept: ENDOCRINOLOGY | Facility: HOSPITAL | Age: 45
Discharge: HOME | End: 2020-09-22
Attending: INTERNAL MEDICINE
Payer: COMMERCIAL

## 2020-09-22 ENCOUNTER — TELEPHONE (OUTPATIENT)
Dept: INTERNAL MEDICINE CLINIC | Facility: CLINIC | Age: 45
End: 2020-09-22

## 2020-09-22 VITALS — BODY MASS INDEX: 33 KG/M2 | WEIGHT: 222.5 LBS

## 2020-09-22 DIAGNOSIS — Z79.4 TYPE 2 DIABETES MELLITUS WITH HYPERGLYCEMIA, WITH LONG-TERM CURRENT USE OF INSULIN (HCC): Primary | ICD-10-CM

## 2020-09-22 DIAGNOSIS — E11.65 TYPE 2 DIABETES MELLITUS WITH HYPERGLYCEMIA, WITH LONG-TERM CURRENT USE OF INSULIN (HCC): Primary | ICD-10-CM

## 2020-09-22 RX ORDER — PEN NEEDLE, DIABETIC 32GX 5/32"
NEEDLE, DISPOSABLE MISCELLANEOUS
Qty: 300 BOX | Refills: 1 | Status: SHIPPED | OUTPATIENT
Start: 2020-09-22 | End: 2020-09-22

## 2020-09-22 RX ORDER — PEN NEEDLE, DIABETIC 32GX 5/32"
NEEDLE, DISPOSABLE MISCELLANEOUS
Qty: 300 EACH | Refills: 1 | Status: SHIPPED | OUTPATIENT
Start: 2020-09-22 | End: 2021-01-22

## 2020-09-22 NOTE — PROGRESS NOTES
Maurilio Clark  : 1975 attended individual initial assessment for Diabetes Education:    Date: 2020   Start time: 2:25 pm End time: 3:35 pm    HEMOGLOBIN A1C (%)   Date Value   2019 11.0 (A)     HgbA1C (%)   Date Value   2020 11.2 ( pm: rice, bread, vegetables, noodles  Drink: coffee with 1/2 milk, tea without milk  Snacks: varies depending on the day. Often snacks when preparing meals.     Discussed the importance of consuming 3 meals per day spaced evening throughout the day and Juan Alberto Stoddard Calvin  [phone]

## 2020-09-22 NOTE — PATIENT INSTRUCTIONS
Goals     1. EDC - Activity (pt-stated)      Note created  9/22/2020  3:27 PM by Dolores Champion, RN     I will walk for 10-15 minutes after dinner      2.  EDC - Healthy (pt-stated)      Note created  9/22/2020  3:26 PM by Dolores Champion, RN     I will eat 3

## 2020-09-22 NOTE — TELEPHONE ENCOUNTER
Last Ov:5/26/20 phone   Upcoming appt:9/23/20  Last labs:9/5/20 lipid, A1C, cmp  Last Rx:8/18/20 lantus solostar, novolog flexpen, trulicity, continuous blood gluc sensor    Per Protocol sent for review

## 2020-09-23 ENCOUNTER — TELEPHONE (OUTPATIENT)
Dept: ENDOCRINOLOGY | Facility: HOSPITAL | Age: 45
End: 2020-09-23

## 2020-09-23 DIAGNOSIS — E11.65 TYPE 2 DIABETES MELLITUS WITH HYPERGLYCEMIA, WITH LONG-TERM CURRENT USE OF INSULIN (HCC): Primary | ICD-10-CM

## 2020-09-23 DIAGNOSIS — Z79.4 TYPE 2 DIABETES MELLITUS WITH HYPERGLYCEMIA, WITH LONG-TERM CURRENT USE OF INSULIN (HCC): Primary | ICD-10-CM

## 2020-09-23 RX ORDER — FLASH GLUCOSE SENSOR
KIT MISCELLANEOUS
Qty: 2 EACH | Refills: 1 | Status: SHIPPED | OUTPATIENT
Start: 2020-09-23 | End: 2020-11-23

## 2020-09-23 RX ORDER — DULAGLUTIDE 0.75 MG/.5ML
0.5 INJECTION, SOLUTION SUBCUTANEOUS WEEKLY
Qty: 2 ML | Refills: 0 | Status: SHIPPED | OUTPATIENT
Start: 2020-09-23 | End: 2020-12-03

## 2020-09-23 RX ORDER — INSULIN ASPART 100 [IU]/ML
INJECTION, SOLUTION INTRAVENOUS; SUBCUTANEOUS
Qty: 15 ML | Refills: 0 | Status: SHIPPED | OUTPATIENT
Start: 2020-09-23 | End: 2020-10-31

## 2020-09-23 RX ORDER — INSULIN GLARGINE 100 [IU]/ML
INJECTION, SOLUTION SUBCUTANEOUS
Qty: 30 ML | Refills: 0 | Status: SHIPPED | OUTPATIENT
Start: 2020-09-23 | End: 2020-12-03

## 2020-09-23 NOTE — TELEPHONE ENCOUNTER
Dr. Sherron Lovett,  Your patient was seen at the [address] [place] [address] on 9/22/20. We are requesting a new order/referral for Intensive insulin management due to her insulin medications.   If you would consider allowing us to work with her and adj

## 2020-10-02 ENCOUNTER — OFFICE VISIT (OUTPATIENT)
Dept: INTERNAL MEDICINE CLINIC | Facility: CLINIC | Age: 45
End: 2020-10-02
Payer: COMMERCIAL

## 2020-10-02 ENCOUNTER — TELEPHONE (OUTPATIENT)
Dept: INTERNAL MEDICINE CLINIC | Facility: CLINIC | Age: 45
End: 2020-10-02

## 2020-10-02 VITALS
TEMPERATURE: 97 F | HEART RATE: 84 BPM | DIASTOLIC BLOOD PRESSURE: 80 MMHG | WEIGHT: 221.19 LBS | HEIGHT: 69 IN | SYSTOLIC BLOOD PRESSURE: 118 MMHG | RESPIRATION RATE: 18 BRPM | BODY MASS INDEX: 32.76 KG/M2

## 2020-10-02 DIAGNOSIS — Z79.4 TYPE 2 DIABETES MELLITUS WITH HYPERGLYCEMIA, WITH LONG-TERM CURRENT USE OF INSULIN (HCC): Primary | ICD-10-CM

## 2020-10-02 DIAGNOSIS — E11.65 TYPE 2 DIABETES MELLITUS WITH HYPERGLYCEMIA, WITH LONG-TERM CURRENT USE OF INSULIN (HCC): Primary | ICD-10-CM

## 2020-10-02 DIAGNOSIS — E78.5 HYPERLIPIDEMIA, UNSPECIFIED HYPERLIPIDEMIA TYPE: ICD-10-CM

## 2020-10-02 DIAGNOSIS — R20.2 PARESTHESIAS: ICD-10-CM

## 2020-10-02 DIAGNOSIS — I10 ESSENTIAL HYPERTENSION: ICD-10-CM

## 2020-10-02 PROCEDURE — 90686 IIV4 VACC NO PRSV 0.5 ML IM: CPT | Performed by: INTERNAL MEDICINE

## 2020-10-02 PROCEDURE — 3079F DIAST BP 80-89 MM HG: CPT | Performed by: INTERNAL MEDICINE

## 2020-10-02 PROCEDURE — 3008F BODY MASS INDEX DOCD: CPT | Performed by: INTERNAL MEDICINE

## 2020-10-02 PROCEDURE — 90471 IMMUNIZATION ADMIN: CPT | Performed by: INTERNAL MEDICINE

## 2020-10-02 PROCEDURE — 90472 IMMUNIZATION ADMIN EACH ADD: CPT | Performed by: INTERNAL MEDICINE

## 2020-10-02 PROCEDURE — 3074F SYST BP LT 130 MM HG: CPT | Performed by: INTERNAL MEDICINE

## 2020-10-02 PROCEDURE — 90732 PPSV23 VACC 2 YRS+ SUBQ/IM: CPT | Performed by: INTERNAL MEDICINE

## 2020-10-02 PROCEDURE — 99214 OFFICE O/P EST MOD 30 MIN: CPT | Performed by: INTERNAL MEDICINE

## 2020-10-02 RX ORDER — DULAGLUTIDE 1.5 MG/.5ML
1.5 INJECTION, SOLUTION SUBCUTANEOUS WEEKLY
Qty: 12 PEN | Refills: 1 | Status: SHIPPED | OUTPATIENT
Start: 2020-10-02 | End: 2020-12-03

## 2020-10-02 NOTE — PROGRESS NOTES
Patient presents with:  Medication Follow-Up: MR rm 9 medication f/up       HPI:  Here for f/u of dm2, htn, high chol. Doing well. Taking meds, sugars areimproving not to goal, seeing dm ed.  She notes some paresthesias and pain in hands at times, worse in 118/80 (BP Location: Left arm, Patient Position: Sitting, Cuff Size: adult)   Pulse 84   Temp 97 °F (36.1 °C) (Temporal)   Resp 18   Ht 69\"   Wt 221 lb 3.2 oz (100.3 kg)   BMI 32.67 kg/m²   Constitutional: Oriented to person, place, and time. No distress. visit. All questions were answered and the patient understands the plan.

## 2020-10-02 NOTE — TELEPHONE ENCOUNTER
Message  Received:  Today  Message Contents   Obdulia Romero MD  [facility] Clinical Staff             Pap done with dr Rubin Yusuf; please call to get record

## 2020-10-06 ENCOUNTER — APPOINTMENT (OUTPATIENT)
Dept: ENDOCRINOLOGY | Facility: HOSPITAL | Age: 45
End: 2020-10-06
Attending: INTERNAL MEDICINE
Payer: COMMERCIAL

## 2020-10-06 NOTE — TELEPHONE ENCOUNTER
[facility] called Dr Earnest Shoemaker office [phone]. Copy of pap results will be sent to office.

## 2020-10-22 ENCOUNTER — HOSPITAL ENCOUNTER (OUTPATIENT)
Dept: ENDOCRINOLOGY | Facility: HOSPITAL | Age: 45
Discharge: HOME | End: 2020-10-22
Attending: INTERNAL MEDICINE
Payer: COMMERCIAL

## 2020-10-22 VITALS — WEIGHT: 222.38 LBS | BODY MASS INDEX: 33 KG/M2

## 2020-10-22 DIAGNOSIS — Z79.4 TYPE 2 DIABETES MELLITUS WITH HYPERGLYCEMIA, WITH LONG-TERM CURRENT USE OF INSULIN (HCC): Primary | ICD-10-CM

## 2020-10-22 DIAGNOSIS — E11.65 TYPE 2 DIABETES MELLITUS WITH HYPERGLYCEMIA, WITH LONG-TERM CURRENT USE OF INSULIN (HCC): Primary | ICD-10-CM

## 2020-10-22 NOTE — PROGRESS NOTES
Gely Samuel 7/1/1975 attended for Intensive Individual Management:    Date: 10/22/2020 Start Time: 10:25 am End Time: 11:25 am    Marie states she kept food, insulin and blood sugar logs for 5 days, but left them at home.   She reports that due to th portions of rice and including vegetables and protein with meals. She states she remains to be hungry on occasion. She reports she consumes coffee with 1 cup of milk and fruit for bedtime snack. Discussed alternative bedtime snack.  Instructed her to decr

## 2020-10-22 NOTE — PATIENT INSTRUCTIONS
Goals                 Today    1. EDC - Activity (pt-stated)   Not Started     Note created  9/22/2020  3:27 PM by Tao Kay, DWAYNE     I will walk for 10-15 minutes after dinner      2.  EDC - Healthy (pt-stated)   Doing What I Said     Note created  9/22

## 2020-10-31 RX ORDER — INSULIN ASPART 100 [IU]/ML
INJECTION, SOLUTION INTRAVENOUS; SUBCUTANEOUS
Qty: 15 ML | Refills: 0 | Status: SHIPPED | OUTPATIENT
Start: 2020-10-31 | End: 2020-12-08

## 2020-10-31 NOTE — TELEPHONE ENCOUNTER
Last Ov:10/2/20  Upcoming appt:none  Last labs:9/5/20 lipid, A1C, cmp  Last Rx:9/23/20 novolog    Per Protocol sent for review

## 2020-11-20 ENCOUNTER — APPOINTMENT (OUTPATIENT)
Dept: ENDOCRINOLOGY | Facility: HOSPITAL | Age: 45
End: 2020-11-20
Attending: INTERNAL MEDICINE
Payer: COMMERCIAL

## 2020-11-23 RX ORDER — FLASH GLUCOSE SENSOR
KIT MISCELLANEOUS
Qty: 2 EACH | Refills: 1 | Status: SHIPPED | OUTPATIENT
Start: 2020-11-23 | End: 2020-12-08

## 2020-11-30 ENCOUNTER — APPOINTMENT (OUTPATIENT)
Dept: ENDOCRINOLOGY | Facility: HOSPITAL | Age: 45
End: 2020-11-30
Attending: INTERNAL MEDICINE
Payer: COMMERCIAL

## 2020-12-03 ENCOUNTER — TELEPHONE (OUTPATIENT)
Dept: ENDOCRINOLOGY CLINIC | Facility: CLINIC | Age: 45
End: 2020-12-03

## 2020-12-03 ENCOUNTER — OFFICE VISIT (OUTPATIENT)
Dept: ENDOCRINOLOGY CLINIC | Facility: CLINIC | Age: 45
End: 2020-12-03
Payer: COMMERCIAL

## 2020-12-03 VITALS
RESPIRATION RATE: 18 BRPM | HEART RATE: 104 BPM | SYSTOLIC BLOOD PRESSURE: 141 MMHG | HEIGHT: 69 IN | WEIGHT: 226.63 LBS | BODY MASS INDEX: 33.57 KG/M2 | DIASTOLIC BLOOD PRESSURE: 91 MMHG

## 2020-12-03 DIAGNOSIS — E11.65 INSULIN DEPENDENT TYPE 2 DIABETES MELLITUS, UNCONTROLLED (HCC): ICD-10-CM

## 2020-12-03 DIAGNOSIS — Z79.4 INSULIN DEPENDENT TYPE 2 DIABETES MELLITUS, UNCONTROLLED (HCC): ICD-10-CM

## 2020-12-03 DIAGNOSIS — E04.9 THYROID ENLARGEMENT: ICD-10-CM

## 2020-12-03 DIAGNOSIS — E11.65 UNCONTROLLED TYPE 2 DIABETES MELLITUS WITH HYPERGLYCEMIA (HCC): Primary | ICD-10-CM

## 2020-12-03 PROCEDURE — 99244 OFF/OP CNSLTJ NEW/EST MOD 40: CPT | Performed by: INTERNAL MEDICINE

## 2020-12-03 PROCEDURE — 3077F SYST BP >= 140 MM HG: CPT | Performed by: INTERNAL MEDICINE

## 2020-12-03 PROCEDURE — 3080F DIAST BP >= 90 MM HG: CPT | Performed by: INTERNAL MEDICINE

## 2020-12-03 PROCEDURE — 36416 COLLJ CAPILLARY BLOOD SPEC: CPT | Performed by: INTERNAL MEDICINE

## 2020-12-03 PROCEDURE — 82962 GLUCOSE BLOOD TEST: CPT | Performed by: INTERNAL MEDICINE

## 2020-12-03 PROCEDURE — 3008F BODY MASS INDEX DOCD: CPT | Performed by: INTERNAL MEDICINE

## 2020-12-03 PROCEDURE — 83036 HEMOGLOBIN GLYCOSYLATED A1C: CPT | Performed by: INTERNAL MEDICINE

## 2020-12-03 RX ORDER — DULAGLUTIDE 1.5 MG/.5ML
1.5 INJECTION, SOLUTION SUBCUTANEOUS WEEKLY
Qty: 4 PEN | Refills: 0 | Status: SHIPPED | OUTPATIENT
Start: 2020-12-03 | End: 2021-01-22

## 2020-12-03 RX ORDER — INSULIN GLARGINE 100 [IU]/ML
INJECTION, SOLUTION SUBCUTANEOUS
Qty: 45 ML | Refills: 1 | Status: SHIPPED | OUTPATIENT
Start: 2020-12-03 | End: 2021-01-22

## 2020-12-03 RX ORDER — DULAGLUTIDE 3 MG/.5ML
3 INJECTION, SOLUTION SUBCUTANEOUS WEEKLY
Qty: 12 PEN | Refills: 1 | Status: SHIPPED | OUTPATIENT
Start: 2021-01-03 | End: 2021-04-27

## 2020-12-03 NOTE — PROGRESS NOTES
Name: Murtaza Pace  Date: 12/3/2020    Referring Physician: Anahimichel Davis is a 41year old female who presents for diabetes mellitus, diagnosed approximately 7-8 years ago.   She also has history of gestatio DAILY, Disp: 300 each, Rfl: 1  •  atorvastatin 40 MG Oral Tab, Take 1 tablet (40 mg total) by mouth nightly., Disp: 90 tablet, Rfl: 0  •  Continuous Blood Gluc  (FREESTYLE RYAN READER) Does not apply Device, Dispense 14 day  Freestyle reader for L nodules or cervical lymphadenopathy  Neck: Trachea midline: Normal  Back: no kyphosis or back tenderness  Lymph Nodes:  No abnormal nodes noted  Musculoskeletal:  normal muscle strength and tone  Skin:  normal moisture and skin texture  Hair & Nails:  norm

## 2020-12-03 NOTE — PATIENT INSTRUCTIONS
INCREASE Trulicity to 8.4MU SQ weekly for one month     THEN INCREASE to Trulicity 3mg SQ weekly    INCREASE Lantus to 40 units SQ bedtime    IF after one week fasting sugar above 150 then increase Lantus to 45 units SQ bedtime    IF after one week sugar s

## 2020-12-03 NOTE — TELEPHONE ENCOUNTER
[facility] wondering if patient is supposed to have medication Dulaglutide (TRULICITY) 1.5 DW/7.1EC Subcutaneous Solution Pen-injector or Dulaglutide (TRULICITY) 3 XR/5.5WF Subcutaneous Solution Pen-injector.  Please

## 2020-12-03 NOTE — TELEPHONE ENCOUNTER
Per LOV today 12/03/20 and notes to increase Trulicity to 1.5 mg weekly x1 month, then increase to 3 mg weekly thereafter. RN spoke to pharmacist and relayed above instruction.

## 2020-12-08 RX ORDER — FLASH GLUCOSE SENSOR
KIT MISCELLANEOUS
Qty: 2 EACH | Refills: 1 | Status: SHIPPED | OUTPATIENT
Start: 2020-12-08 | End: 2020-12-11

## 2020-12-08 RX ORDER — INSULIN ASPART 100 [IU]/ML
INJECTION, SOLUTION INTRAVENOUS; SUBCUTANEOUS
Qty: 15 ML | Refills: 0 | Status: SHIPPED | OUTPATIENT
Start: 2020-12-08 | End: 2020-12-11

## 2020-12-08 NOTE — TELEPHONE ENCOUNTER
Last Ov: 10/2/20, AS, med f/u  Last labs: A1c, glucose 12/3/20  Last Rx: Novolog 100iu/mL, 15mL, 0R 10/31/20    Future Appointments   Date Time Provider Lazaro Bain   12/16/2020 10:30 AM Smita Palmer RN [facility] DIABETES [facility]   3/4/2021  2:30 PM [facility]

## 2020-12-10 ENCOUNTER — LAB ENCOUNTER (OUTPATIENT)
Dept: LAB | Age: 45
End: 2020-12-10
Attending: INTERNAL MEDICINE
Payer: COMMERCIAL

## 2020-12-10 DIAGNOSIS — E11.65 UNCONTROLLED TYPE 2 DIABETES MELLITUS WITH HYPERGLYCEMIA (HCC): ICD-10-CM

## 2020-12-10 PROCEDURE — 84443 ASSAY THYROID STIM HORMONE: CPT

## 2020-12-10 PROCEDURE — 36415 COLL VENOUS BLD VENIPUNCTURE: CPT

## 2020-12-10 PROCEDURE — 84439 ASSAY OF FREE THYROXINE: CPT

## 2020-12-10 PROCEDURE — 82533 TOTAL CORTISOL: CPT

## 2020-12-10 PROCEDURE — 84481 FREE ASSAY (FT-3): CPT

## 2020-12-10 PROCEDURE — 84403 ASSAY OF TOTAL TESTOSTERONE: CPT

## 2020-12-10 PROCEDURE — 84402 ASSAY OF FREE TESTOSTERONE: CPT

## 2020-12-11 ENCOUNTER — HOSPITAL ENCOUNTER (OUTPATIENT)
Dept: ULTRASOUND IMAGING | Age: 45
Discharge: HOME | End: 2020-12-11
Attending: INTERNAL MEDICINE
Payer: COMMERCIAL

## 2020-12-11 DIAGNOSIS — E04.9 THYROID ENLARGEMENT: ICD-10-CM

## 2020-12-11 PROCEDURE — 76536 US EXAM OF HEAD AND NECK: CPT | Performed by: INTERNAL MEDICINE

## 2020-12-14 RX ORDER — FLASH GLUCOSE SENSOR
1 KIT MISCELLANEOUS
Qty: 2 EACH | Refills: 1 | Status: SHIPPED | OUTPATIENT
Start: 2020-12-14 | End: 2021-01-22

## 2020-12-14 NOTE — TELEPHONE ENCOUNTER
Freestyle Neisha-PASSED per protocol, refill sent. Last OV 10.2. 20 w/ AS (DM)   Last PE No recent PE on file   Last REFILL 12.8.20 Novolog 100unit/mL #15mL 0R  Last LABS 9.5.20 Lipid, HgA1c, CMP    Future Appointments   Date Time Provider Department Sunny

## 2020-12-15 ENCOUNTER — HOSPITAL ENCOUNTER (OUTPATIENT)
Dept: MAMMOGRAPHY | Age: 45
Discharge: HOME | End: 2020-12-15
Attending: INTERNAL MEDICINE
Payer: COMMERCIAL

## 2020-12-15 DIAGNOSIS — Z12.31 VISIT FOR SCREENING MAMMOGRAM: ICD-10-CM

## 2020-12-15 DIAGNOSIS — Z12.31 ENCOUNTER FOR SCREENING MAMMOGRAM FOR MALIGNANT NEOPLASM OF BREAST: ICD-10-CM

## 2020-12-15 PROCEDURE — 77063 BREAST TOMOSYNTHESIS BI: CPT | Performed by: SPECIALIST

## 2020-12-15 PROCEDURE — 77067 SCR MAMMO BI INCL CAD: CPT | Performed by: SPECIALIST

## 2020-12-16 ENCOUNTER — HOSPITAL ENCOUNTER (OUTPATIENT)
Dept: ENDOCRINOLOGY | Facility: HOSPITAL | Age: 45
Discharge: HOME | End: 2020-12-16
Attending: INTERNAL MEDICINE
Payer: COMMERCIAL

## 2020-12-16 DIAGNOSIS — E11.65 TYPE 2 DIABETES MELLITUS WITH HYPERGLYCEMIA, WITH LONG-TERM CURRENT USE OF INSULIN (HCC): Primary | ICD-10-CM

## 2020-12-16 DIAGNOSIS — Z79.4 TYPE 2 DIABETES MELLITUS WITH HYPERGLYCEMIA, WITH LONG-TERM CURRENT USE OF INSULIN (HCC): Primary | ICD-10-CM

## 2020-12-16 RX ORDER — INSULIN ASPART 100 [IU]/ML
INJECTION, SOLUTION INTRAVENOUS; SUBCUTANEOUS
Qty: 45 ML | Refills: 1 | Status: SHIPPED | OUTPATIENT
Start: 2020-12-16 | End: 2021-01-22

## 2020-12-16 NOTE — PATIENT INSTRUCTIONS
Goals                 Today    10/22/2020    1. EDC - Activity (pt-stated)   Doing What I Said  Not Started     Note edited  12/16/2020 11:13 AM by Gavin Machado RN     I will do Yoga for 1 hours 3 times a week      2.  EDC - Healthy (pt-stated)   Doing Wh

## 2020-12-16 NOTE — TELEPHONE ENCOUNTER
Last sig for Novolog.   ADMINISTER 50 UNITS UNDER THE SKIN EVERY DAY AS DIRECTED  prescribed on 12/8/20, sig updated

## 2020-12-16 NOTE — PROGRESS NOTES
Maurilio Clark 7/1/1975 attended for Intensive Individual Management:    Date: 12/16/2020 Start Time: 10:39 am End Time: 11:12 am    Realtime audio-video visit via Haiku/Michele. Individual visit due to COVID 19 risk .   Pt verbally consents to this audio-vi schedule appointment in January.

## 2020-12-18 ENCOUNTER — TELEPHONE (OUTPATIENT)
Dept: INTERNAL MEDICINE CLINIC | Facility: CLINIC | Age: 45
End: 2020-12-18

## 2020-12-18 NOTE — TELEPHONE ENCOUNTER
Unable to LVM, mailbox not set up. Sent [id number].  Form signed and successfully sent to E:[phone]

## 2020-12-18 NOTE — TELEPHONE ENCOUNTER
Fax received from [facility]. Placed in AS box for review and completion.  Send to F: [phone]    Future Appointments   Date Time Provider Lazaro Taya   12/28/2020  8:45 AM REF BK RD REF EMG14 Ref Book 14   2/24/2021  6:00 PM Camilo Carreon MD [facility]   3/4/2021  2:30 PM Charolett Simmonds, MD [address] [place] [address]

## 2020-12-18 NOTE — TELEPHONE ENCOUNTER
Pt had her mammo on 12/15/20 and stated she needs additional views. She has tried getting a hold Dr Chen Salguero to get the order but their fax machine is down and they are not able to get the order. Calling to see if AS will order for the pt as she is trying to get it done today.  Agustin advise     She is going to be faxing an order request to be signed or it can be done through epic

## 2020-12-31 ENCOUNTER — HOSPITAL ENCOUNTER (OUTPATIENT)
Dept: MAMMOGRAPHY | Age: 45
Discharge: HOME | End: 2020-12-31
Attending: SPECIALIST
Payer: COMMERCIAL

## 2020-12-31 ENCOUNTER — HOSPITAL ENCOUNTER (OUTPATIENT)
Dept: ULTRASOUND IMAGING | Age: 45
Discharge: HOME | End: 2020-12-31
Attending: SPECIALIST
Payer: COMMERCIAL

## 2020-12-31 DIAGNOSIS — R92.2 INCONCLUSIVE MAMMOGRAM: ICD-10-CM

## 2020-12-31 DIAGNOSIS — Z12.31 ENCOUNTER FOR SCREENING MAMMOGRAM FOR MALIGNANT NEOPLASM OF BREAST: ICD-10-CM

## 2020-12-31 PROCEDURE — 77061 BREAST TOMOSYNTHESIS UNI: CPT | Performed by: SPECIALIST

## 2020-12-31 PROCEDURE — 76641 ULTRASOUND BREAST COMPLETE: CPT | Performed by: SPECIALIST

## 2020-12-31 PROCEDURE — 77065 DX MAMMO INCL CAD UNI: CPT | Performed by: SPECIALIST

## 2021-01-22 DIAGNOSIS — Z79.4 INSULIN DEPENDENT TYPE 2 DIABETES MELLITUS, UNCONTROLLED (HCC): ICD-10-CM

## 2021-01-22 DIAGNOSIS — E11.65 INSULIN DEPENDENT TYPE 2 DIABETES MELLITUS, UNCONTROLLED (HCC): ICD-10-CM

## 2021-01-22 RX ORDER — ATORVASTATIN CALCIUM 40 MG/1
40 TABLET, FILM COATED ORAL NIGHTLY
Qty: 90 TABLET | Refills: 0 | Status: SHIPPED | OUTPATIENT
Start: 2021-01-22 | End: 2021-08-12

## 2021-01-22 RX ORDER — PEN NEEDLE, DIABETIC 32GX 5/32"
NEEDLE, DISPOSABLE MISCELLANEOUS
Qty: 300 EACH | Refills: 1 | Status: SHIPPED | OUTPATIENT
Start: 2021-01-22 | End: 2021-08-12

## 2021-01-22 RX ORDER — FLASH GLUCOSE SENSOR
1 KIT MISCELLANEOUS
Qty: 2 EACH | Refills: 1 | Status: SHIPPED | OUTPATIENT
Start: 2021-01-22 | End: 2021-02-21

## 2021-01-22 NOTE — TELEPHONE ENCOUNTER
Problem: Patient Care Overview  Goal: Plan of Care Review  Flowsheets (Taken 6/8/2020 1109)  Progress: improving  Plan of Care Reviewed With: patient;daughter  Outcome Summary: PT ALERT, FOLLOWING COMMANDS AND MOTIVATED TO GET OOB WITH THERAPY. PERFORMED BED MOBILITY AND TRANSFERS WITH MIN ASSIST OF 2. CONTINUES WITH L SIDED WEAKNESS, L NEGLECT AND SLURRED SPEECH. RECOMMEND IRF AT D/C.       Passed protocol, refills sent.

## 2021-01-23 RX ORDER — INSULIN ASPART 100 [IU]/ML
INJECTION, SOLUTION INTRAVENOUS; SUBCUTANEOUS
Qty: 54 ML | Refills: 0 | Status: SHIPPED | OUTPATIENT
Start: 2021-01-23 | End: 2021-06-07

## 2021-01-23 RX ORDER — INSULIN GLARGINE 100 [IU]/ML
INJECTION, SOLUTION SUBCUTANEOUS
Qty: 45 ML | Refills: 0 | Status: SHIPPED | OUTPATIENT
Start: 2021-01-23 | End: 2021-02-24

## 2021-01-23 RX ORDER — DULAGLUTIDE 1.5 MG/.5ML
1.5 INJECTION, SOLUTION SUBCUTANEOUS WEEKLY
Qty: 4 PEN | Refills: 2 | Status: SHIPPED | OUTPATIENT
Start: 2021-01-23 | End: 2021-05-22

## 2021-02-22 RX ORDER — FLASH GLUCOSE SENSOR
1 KIT MISCELLANEOUS
Qty: 2 EACH | Refills: 0 | Status: SHIPPED | OUTPATIENT
Start: 2021-02-22 | End: 2021-02-24

## 2021-02-22 NOTE — TELEPHONE ENCOUNTER
PASSED per protocol, refill sent.   Last PE No recent PE on file-routed to front to schedule   Future Appointments   Date Time Provider Lazaro Taya   2/24/2021  6:00 PM Colt Betancourt MD [facility]   3/4/2021  2:30 PM Anaya Wall MD [address] [place] [address]

## 2021-02-23 ENCOUNTER — LAB ENCOUNTER (OUTPATIENT)
Dept: LAB | Age: 46
End: 2021-02-23
Attending: INTERNAL MEDICINE
Payer: COMMERCIAL

## 2021-02-23 DIAGNOSIS — E78.5 HYPERLIPIDEMIA, UNSPECIFIED HYPERLIPIDEMIA TYPE: ICD-10-CM

## 2021-02-23 DIAGNOSIS — E11.65 TYPE 2 DIABETES MELLITUS WITH HYPERGLYCEMIA, WITH LONG-TERM CURRENT USE OF INSULIN (HCC): ICD-10-CM

## 2021-02-23 DIAGNOSIS — Z79.4 TYPE 2 DIABETES MELLITUS WITH HYPERGLYCEMIA, WITH LONG-TERM CURRENT USE OF INSULIN (HCC): ICD-10-CM

## 2021-02-23 LAB
ALBUMIN SERPL-MCNC: 3.5 G/DL (ref 3.4–5)
ALBUMIN/GLOB SERPL: 0.9 {RATIO} (ref 1–2)
ALP LIVER SERPL-CCNC: 83 U/L
ALT SERPL-CCNC: 21 U/L
ANION GAP SERPL CALC-SCNC: 7 MMOL/L (ref 0–18)
AST SERPL-CCNC: 13 U/L (ref 15–37)
BILIRUB SERPL-MCNC: 0.5 MG/DL (ref 0.1–2)
BUN BLD-MCNC: 10 MG/DL (ref 7–18)
BUN/CREAT SERPL: 13.9 (ref 10–20)
CALCIUM BLD-MCNC: 9.3 MG/DL (ref 8.5–10.1)
CHLORIDE SERPL-SCNC: 107 MMOL/L (ref 98–112)
CHOLEST SMN-MCNC: 175 MG/DL (ref ?–200)
CO2 SERPL-SCNC: 24 MMOL/L (ref 21–32)
CREAT BLD-MCNC: 0.72 MG/DL
EST. AVERAGE GLUCOSE BLD GHB EST-MCNC: 252 MG/DL (ref 68–126)
GLOBULIN PLAS-MCNC: 4 G/DL (ref 2.8–4.4)
GLUCOSE BLD-MCNC: 196 MG/DL (ref 70–99)
HBA1C MFR BLD HPLC: 10.4 % (ref ?–5.7)
HDLC SERPL-MCNC: 40 MG/DL (ref 40–59)
LDLC SERPL CALC-MCNC: 102 MG/DL (ref ?–100)
M PROTEIN MFR SERPL ELPH: 7.5 G/DL (ref 6.4–8.2)
NONHDLC SERPL-MCNC: 135 MG/DL (ref ?–130)
OSMOLALITY SERPL CALC.SUM OF ELEC: 290 MOSM/KG (ref 275–295)
PATIENT FASTING Y/N/NP: YES
PATIENT FASTING Y/N/NP: YES
POTASSIUM SERPL-SCNC: 4.4 MMOL/L (ref 3.5–5.1)
SODIUM SERPL-SCNC: 138 MMOL/L (ref 136–145)
TRIGL SERPL-MCNC: 167 MG/DL (ref 30–149)
VLDLC SERPL CALC-MCNC: 33 MG/DL (ref 0–30)

## 2021-02-23 PROCEDURE — 36415 COLL VENOUS BLD VENIPUNCTURE: CPT | Performed by: INTERNAL MEDICINE

## 2021-02-23 PROCEDURE — 80053 COMPREHEN METABOLIC PANEL: CPT | Performed by: INTERNAL MEDICINE

## 2021-02-23 PROCEDURE — 83036 HEMOGLOBIN GLYCOSYLATED A1C: CPT | Performed by: INTERNAL MEDICINE

## 2021-02-23 PROCEDURE — 80061 LIPID PANEL: CPT | Performed by: INTERNAL MEDICINE

## 2021-02-24 ENCOUNTER — OFFICE VISIT (OUTPATIENT)
Dept: INTERNAL MEDICINE CLINIC | Facility: CLINIC | Age: 46
End: 2021-02-24
Payer: COMMERCIAL

## 2021-02-24 VITALS
TEMPERATURE: 97 F | BODY MASS INDEX: 32.58 KG/M2 | DIASTOLIC BLOOD PRESSURE: 88 MMHG | WEIGHT: 220 LBS | HEIGHT: 69 IN | RESPIRATION RATE: 18 BRPM | HEART RATE: 88 BPM | SYSTOLIC BLOOD PRESSURE: 118 MMHG

## 2021-02-24 DIAGNOSIS — R10.13 EPIGASTRIC ABDOMINAL PAIN: ICD-10-CM

## 2021-02-24 DIAGNOSIS — Z79.4 INSULIN DEPENDENT TYPE 2 DIABETES MELLITUS, UNCONTROLLED (HCC): ICD-10-CM

## 2021-02-24 DIAGNOSIS — E11.65 INSULIN DEPENDENT TYPE 2 DIABETES MELLITUS, UNCONTROLLED (HCC): ICD-10-CM

## 2021-02-24 DIAGNOSIS — Z79.4 TYPE 2 DIABETES MELLITUS WITH HYPERGLYCEMIA, WITH LONG-TERM CURRENT USE OF INSULIN (HCC): ICD-10-CM

## 2021-02-24 DIAGNOSIS — Z00.00 PE (PHYSICAL EXAM), ANNUAL: Primary | ICD-10-CM

## 2021-02-24 DIAGNOSIS — E78.00 PURE HYPERCHOLESTEROLEMIA: ICD-10-CM

## 2021-02-24 DIAGNOSIS — E11.65 TYPE 2 DIABETES MELLITUS WITH HYPERGLYCEMIA, WITH LONG-TERM CURRENT USE OF INSULIN (HCC): ICD-10-CM

## 2021-02-24 PROCEDURE — 3079F DIAST BP 80-89 MM HG: CPT | Performed by: INTERNAL MEDICINE

## 2021-02-24 PROCEDURE — 3008F BODY MASS INDEX DOCD: CPT | Performed by: INTERNAL MEDICINE

## 2021-02-24 PROCEDURE — 99213 OFFICE O/P EST LOW 20 MIN: CPT | Performed by: INTERNAL MEDICINE

## 2021-02-24 PROCEDURE — 99396 PREV VISIT EST AGE 40-64: CPT | Performed by: INTERNAL MEDICINE

## 2021-02-24 PROCEDURE — 3074F SYST BP LT 130 MM HG: CPT | Performed by: INTERNAL MEDICINE

## 2021-02-24 RX ORDER — MINOCYCLINE HYDROCHLORIDE 100 MG/1
CAPSULE ORAL
COMMUNITY
Start: 2020-09-23

## 2021-02-24 RX ORDER — INSULIN GLARGINE 100 [IU]/ML
INJECTION, SOLUTION SUBCUTANEOUS
Qty: 45 ML | Refills: 0 | COMMUNITY
Start: 2021-02-24 | End: 2021-06-07

## 2021-02-24 RX ORDER — FLASH GLUCOSE SENSOR
1 KIT MISCELLANEOUS
Qty: 6 EACH | Refills: 1 | Status: SHIPPED | OUTPATIENT
Start: 2021-02-24 | End: 2021-06-07

## 2021-02-26 ENCOUNTER — LAB ENCOUNTER (OUTPATIENT)
Dept: LAB | Age: 46
End: 2021-02-26
Attending: INTERNAL MEDICINE
Payer: COMMERCIAL

## 2021-02-26 ENCOUNTER — TELEPHONE (OUTPATIENT)
Dept: INTERNAL MEDICINE CLINIC | Facility: CLINIC | Age: 46
End: 2021-02-26

## 2021-02-26 DIAGNOSIS — Z79.4 TYPE 2 DIABETES MELLITUS WITH HYPERGLYCEMIA, WITH LONG-TERM CURRENT USE OF INSULIN (HCC): ICD-10-CM

## 2021-02-26 DIAGNOSIS — E11.65 TYPE 2 DIABETES MELLITUS WITH HYPERGLYCEMIA, WITH LONG-TERM CURRENT USE OF INSULIN (HCC): ICD-10-CM

## 2021-02-26 DIAGNOSIS — R10.13 EPIGASTRIC ABDOMINAL PAIN: ICD-10-CM

## 2021-02-26 LAB
ALBUMIN SERPL-MCNC: 3.5 G/DL (ref 3.4–5)
ALBUMIN/GLOB SERPL: 0.9 {RATIO} (ref 1–2)
ALP LIVER SERPL-CCNC: 98 U/L
ALT SERPL-CCNC: 26 U/L
AMYLASE SERPL-CCNC: 34 U/L (ref 25–115)
ANION GAP SERPL CALC-SCNC: 5 MMOL/L (ref 0–18)
AST SERPL-CCNC: 13 U/L (ref 15–37)
BILIRUB SERPL-MCNC: 0.2 MG/DL (ref 0.1–2)
BUN BLD-MCNC: 15 MG/DL (ref 7–18)
BUN/CREAT SERPL: 16.9 (ref 10–20)
CALCIUM BLD-MCNC: 9.5 MG/DL (ref 8.5–10.1)
CHLORIDE SERPL-SCNC: 104 MMOL/L (ref 98–112)
CO2 SERPL-SCNC: 27 MMOL/L (ref 21–32)
CREAT BLD-MCNC: 0.89 MG/DL
CREAT UR-SCNC: 123 MG/DL
EST. AVERAGE GLUCOSE BLD GHB EST-MCNC: 255 MG/DL (ref 68–126)
GLOBULIN PLAS-MCNC: 4.1 G/DL (ref 2.8–4.4)
GLUCOSE BLD-MCNC: 247 MG/DL (ref 70–99)
HBA1C MFR BLD HPLC: 10.5 % (ref ?–5.7)
LIPASE SERPL-CCNC: 142 U/L (ref 73–393)
M PROTEIN MFR SERPL ELPH: 7.6 G/DL (ref 6.4–8.2)
MICROALBUMIN UR-MCNC: 1.59 MG/DL
MICROALBUMIN/CREAT 24H UR-RTO: 12.9 UG/MG (ref ?–30)
OSMOLALITY SERPL CALC.SUM OF ELEC: 291 MOSM/KG (ref 275–295)
PATIENT FASTING Y/N/NP: NO
POTASSIUM SERPL-SCNC: 4.4 MMOL/L (ref 3.5–5.1)
SODIUM SERPL-SCNC: 136 MMOL/L (ref 136–145)

## 2021-02-26 PROCEDURE — 83036 HEMOGLOBIN GLYCOSYLATED A1C: CPT | Performed by: INTERNAL MEDICINE

## 2021-02-26 PROCEDURE — 83690 ASSAY OF LIPASE: CPT | Performed by: INTERNAL MEDICINE

## 2021-02-26 PROCEDURE — 3061F NEG MICROALBUMINURIA REV: CPT | Performed by: INTERNAL MEDICINE

## 2021-02-26 PROCEDURE — 80053 COMPREHEN METABOLIC PANEL: CPT | Performed by: INTERNAL MEDICINE

## 2021-02-26 PROCEDURE — 82150 ASSAY OF AMYLASE: CPT | Performed by: INTERNAL MEDICINE

## 2021-02-26 PROCEDURE — 82570 ASSAY OF URINE CREATININE: CPT | Performed by: INTERNAL MEDICINE

## 2021-02-26 PROCEDURE — 36415 COLL VENOUS BLD VENIPUNCTURE: CPT | Performed by: INTERNAL MEDICINE

## 2021-02-26 PROCEDURE — 82043 UR ALBUMIN QUANTITATIVE: CPT | Performed by: INTERNAL MEDICINE

## 2021-02-26 NOTE — TELEPHONE ENCOUNTER
Patient notified amylase and lipase unable to be added. Advised to have completed, and have DM labs completed in 3 months. Patient verbalized understanding.

## 2021-02-26 NOTE — TELEPHONE ENCOUNTER
Message  Received: Today  Message Contents   Marivel Estrada MD  P [facility] Clinical Staff             I tried to add on amylase and lipase to old labs, seems they did not do it. Pt will need to have amylase and lipase re drawn now, other labs in 3 mos.  Ple

## 2021-04-14 ENCOUNTER — TELEPHONE (OUTPATIENT)
Dept: INTERNAL MEDICINE CLINIC | Facility: CLINIC | Age: 46
End: 2021-04-14

## 2021-04-21 ENCOUNTER — OFFICE VISIT (OUTPATIENT)
Dept: ELECTROPHYSIOLOGY | Facility: HOSPITAL | Age: 46
End: 2021-04-21
Attending: INTERNAL MEDICINE
Payer: COMMERCIAL

## 2021-04-21 DIAGNOSIS — G56.03 BILATERAL CARPAL TUNNEL SYNDROME: Primary | ICD-10-CM

## 2021-04-21 DIAGNOSIS — R20.2 PARESTHESIAS: ICD-10-CM

## 2021-04-21 PROCEDURE — 95886 MUSC TEST DONE W/N TEST COMP: CPT | Performed by: OTHER

## 2021-04-21 PROCEDURE — 95910 NRV CNDJ TEST 7-8 STUDIES: CPT | Performed by: OTHER

## 2021-04-22 NOTE — PROCEDURES
[facility], [address] [place] [address]      PATIENT'S NAME: [facility]   REFERRING PHYSICIAN: Ibrahima June M.D.    PATIENT ACCOUNT #: [de-identified] LOCATION: [facility]   MEDICAL RECORD #: [id number] DATE OF BIRTH: [birth year]

## 2021-04-27 ENCOUNTER — OFFICE VISIT (OUTPATIENT)
Dept: INTERNAL MEDICINE CLINIC | Facility: CLINIC | Age: 46
End: 2021-04-27
Payer: COMMERCIAL

## 2021-04-27 VITALS
BODY MASS INDEX: 32.88 KG/M2 | OXYGEN SATURATION: 99 % | SYSTOLIC BLOOD PRESSURE: 122 MMHG | DIASTOLIC BLOOD PRESSURE: 78 MMHG | TEMPERATURE: 97 F | HEIGHT: 69 IN | RESPIRATION RATE: 18 BRPM | WEIGHT: 222 LBS | HEART RATE: 98 BPM

## 2021-04-27 DIAGNOSIS — I10 ESSENTIAL HYPERTENSION: ICD-10-CM

## 2021-04-27 DIAGNOSIS — E11.65 TYPE 2 DIABETES MELLITUS WITH HYPERGLYCEMIA, WITH LONG-TERM CURRENT USE OF INSULIN (HCC): ICD-10-CM

## 2021-04-27 DIAGNOSIS — Z79.4 TYPE 2 DIABETES MELLITUS WITH HYPERGLYCEMIA, WITH LONG-TERM CURRENT USE OF INSULIN (HCC): ICD-10-CM

## 2021-04-27 DIAGNOSIS — G56.03 BILATERAL CARPAL TUNNEL SYNDROME: ICD-10-CM

## 2021-04-27 DIAGNOSIS — M79.604 RIGHT LEG PAIN: Primary | ICD-10-CM

## 2021-04-27 DIAGNOSIS — R21 RASH: ICD-10-CM

## 2021-04-27 PROBLEM — Z00.00 HEALTHCARE MAINTENANCE: Status: RESOLVED | Noted: 2019-07-31 | Resolved: 2021-04-27

## 2021-04-27 PROCEDURE — 3074F SYST BP LT 130 MM HG: CPT | Performed by: NURSE PRACTITIONER

## 2021-04-27 PROCEDURE — 3078F DIAST BP <80 MM HG: CPT | Performed by: NURSE PRACTITIONER

## 2021-04-27 PROCEDURE — 3008F BODY MASS INDEX DOCD: CPT | Performed by: NURSE PRACTITIONER

## 2021-04-27 PROCEDURE — 99214 OFFICE O/P EST MOD 30 MIN: CPT | Performed by: NURSE PRACTITIONER

## 2021-04-27 RX ORDER — NAPROXEN 500 MG/1
500 TABLET ORAL 2 TIMES DAILY WITH MEALS
Qty: 30 TABLET | Refills: 0 | Status: SHIPPED | OUTPATIENT
Start: 2021-04-27

## 2021-05-03 ENCOUNTER — APPOINTMENT (OUTPATIENT)
Dept: ENDOCRINOLOGY | Facility: HOSPITAL | Age: 46
End: 2021-05-03
Attending: INTERNAL MEDICINE
Payer: COMMERCIAL

## 2021-05-11 ENCOUNTER — HOSPITAL ENCOUNTER (OUTPATIENT)
Dept: ENDOCRINOLOGY | Facility: HOSPITAL | Age: 46
Discharge: HOME | End: 2021-05-11
Attending: INTERNAL MEDICINE
Payer: COMMERCIAL

## 2021-05-11 VITALS — BODY MASS INDEX: 33 KG/M2 | WEIGHT: 223 LBS

## 2021-05-11 DIAGNOSIS — E11.65 TYPE 2 DIABETES MELLITUS WITH HYPERGLYCEMIA, WITH LONG-TERM CURRENT USE OF INSULIN (HCC): Primary | ICD-10-CM

## 2021-05-11 DIAGNOSIS — Z79.4 TYPE 2 DIABETES MELLITUS WITH HYPERGLYCEMIA, WITH LONG-TERM CURRENT USE OF INSULIN (HCC): Primary | ICD-10-CM

## 2021-05-11 NOTE — PATIENT INSTRUCTIONS
Goals                    Today    12/16/2020    10/22/2020    1.   EDC - Activity (pt-stated)   Started but Stopped  Doing What I Said  Not Started      Note edited  12/16/2020 11:13 AM by Kerry Rosen, RN      I will do Yoga for 1 hours 3 times a week

## 2021-05-11 NOTE — PROGRESS NOTES
Kate Crow 7/1/1975 attended for Individual   Intensive Individual Management:    Date: 5/11/2021 Start Time: 8:28 am End Time: 9:10 am    Assessment:  Marie reports she has had a recent EMG and dilated eye exam. She reports she been told she has a provided to her    Diet: Breakfast, Lunch and Dinner   Aiming for 30 gms of CHO for lunch and breakfast, 30-45 gms for dinner  Drink coffee (1/4-1/2 cup of milk)  She reports meals are balanced but does reports she feels hungry at time.     Other:  She has

## 2021-05-19 ENCOUNTER — APPOINTMENT (OUTPATIENT)
Dept: ENDOCRINOLOGY | Facility: HOSPITAL | Age: 46
End: 2021-05-19
Attending: INTERNAL MEDICINE
Payer: COMMERCIAL

## 2021-05-22 ENCOUNTER — OFFICE VISIT (OUTPATIENT)
Dept: INTERNAL MEDICINE CLINIC | Facility: CLINIC | Age: 46
End: 2021-05-22
Payer: COMMERCIAL

## 2021-05-22 VITALS
TEMPERATURE: 98 F | BODY MASS INDEX: 33.23 KG/M2 | HEIGHT: 69 IN | WEIGHT: 224.38 LBS | RESPIRATION RATE: 18 BRPM | SYSTOLIC BLOOD PRESSURE: 118 MMHG | DIASTOLIC BLOOD PRESSURE: 82 MMHG

## 2021-05-22 DIAGNOSIS — G56.03 BILATERAL CARPAL TUNNEL SYNDROME: ICD-10-CM

## 2021-05-22 DIAGNOSIS — E11.65 TYPE 2 DIABETES MELLITUS WITH HYPERGLYCEMIA, WITH LONG-TERM CURRENT USE OF INSULIN (HCC): ICD-10-CM

## 2021-05-22 DIAGNOSIS — I10 ESSENTIAL HYPERTENSION: ICD-10-CM

## 2021-05-22 DIAGNOSIS — E78.00 PURE HYPERCHOLESTEROLEMIA: Primary | ICD-10-CM

## 2021-05-22 DIAGNOSIS — Z79.4 TYPE 2 DIABETES MELLITUS WITH HYPERGLYCEMIA, WITH LONG-TERM CURRENT USE OF INSULIN (HCC): ICD-10-CM

## 2021-05-22 PROBLEM — E11.311 DIABETIC RETINOPATHY OF BOTH EYES WITH MACULAR EDEMA ASSOCIATED WITH TYPE 2 DIABETES MELLITUS (HCC): Status: ACTIVE | Noted: 2021-05-22

## 2021-05-22 PROCEDURE — 83036 HEMOGLOBIN GLYCOSYLATED A1C: CPT | Performed by: INTERNAL MEDICINE

## 2021-05-22 PROCEDURE — 3079F DIAST BP 80-89 MM HG: CPT | Performed by: INTERNAL MEDICINE

## 2021-05-22 PROCEDURE — 99214 OFFICE O/P EST MOD 30 MIN: CPT | Performed by: INTERNAL MEDICINE

## 2021-05-22 PROCEDURE — 3074F SYST BP LT 130 MM HG: CPT | Performed by: INTERNAL MEDICINE

## 2021-05-22 PROCEDURE — 3008F BODY MASS INDEX DOCD: CPT | Performed by: INTERNAL MEDICINE

## 2021-05-22 RX ORDER — DULAGLUTIDE 1.5 MG/.5ML
3 INJECTION, SOLUTION SUBCUTANEOUS WEEKLY
Qty: 4 PEN | Refills: 2 | COMMUNITY
Start: 2021-05-22 | End: 2021-05-24

## 2021-05-22 NOTE — PROGRESS NOTES
Patient presents with:  Diabetes: MR rm 8 3month DM f/up       HPI:  Here for f/u of htn, high chol and dm2. Pt has dveloed retinopathy due to dm, seeing ophtho. She has not yet increased trulicity to 3mg.  Las show improving a1c to 8.8. seeing endo this we (FREESTYLE RYAN READER) Does not apply Device Dispense 14 day  Freestyle reader for Ryan CGM 1 Device 0   • triamcinolone acetonide 0.1 % External Cream Apply topically 2 (two) times daily as needed.  (Patient not taking: Reported on 5/22/2021 ) 30 g 1 Refills for this Visit:  Requested Prescriptions      No prescriptions requested or ordered in this encounter       Imaging & Consults:  ORTHOPEDIC - INTERNAL    No follow-ups on file. There are no Patient Instructions on file for this visit.     All quest

## 2021-05-24 ENCOUNTER — TELEMEDICINE (OUTPATIENT)
Dept: ENDOCRINOLOGY CLINIC | Facility: CLINIC | Age: 46
End: 2021-05-24

## 2021-05-24 DIAGNOSIS — R94.6 BORDERLINE ABNORMAL TFTS: Primary | ICD-10-CM

## 2021-05-24 PROCEDURE — 99214 OFFICE O/P EST MOD 30 MIN: CPT | Performed by: INTERNAL MEDICINE

## 2021-05-24 RX ORDER — DULAGLUTIDE 3 MG/.5ML
3 INJECTION, SOLUTION SUBCUTANEOUS WEEKLY
Qty: 4 PEN | Refills: 0 | Status: SHIPPED | OUTPATIENT
Start: 2021-05-24 | End: 2021-10-08

## 2021-05-24 RX ORDER — DULAGLUTIDE 4.5 MG/.5ML
4.5 INJECTION, SOLUTION SUBCUTANEOUS WEEKLY
Qty: 4 PEN | Refills: 3 | Status: SHIPPED | OUTPATIENT
Start: 2021-06-24 | End: 2021-08-12

## 2021-05-24 NOTE — PROGRESS NOTES
Please note that the following visit was completed using two-way, real-time interactive audio and video communication.   This has been done in good fabrizio to provide continuity of care in the best interest of the provider-patient relationship, due to the fermin No      Medications:     Current Outpatient Medications:   •  Dulaglutide (TRULICITY) 1.5 HO/4.7UR Subcutaneous Solution Pen-injector, Inject 3 mg into the skin once a week., Disp: 4 pen, Rfl: 2  •  naproxen 500 MG Oral Tab, Take 1 tablet (500 mg total) by Never      Sexual activity: Yes    Other Topics      Concerns:      Medical History:   Past Medical History:   Diagnosis Date   • Diabetes ([facility] [address]    • Hyperlipidemia    • Rosacea, acne        Surgical history:   Past Surgical History:   Procedure Laterality

## 2021-05-25 ENCOUNTER — TELEPHONE (OUTPATIENT)
Dept: ENDOCRINOLOGY CLINIC | Facility: CLINIC | Age: 46
End: 2021-05-25

## 2021-05-25 NOTE — TELEPHONE ENCOUNTER
Pharmacy called to verify strength on Trulicity. Please call.         Current Outpatient Medications:   •  Dulaglutide (TRULICITY) 3 UX/9.0FC Subcutaneous Solution Pen-injector, Inject 3 mg into the skin once a week., Disp: 4 pen, Rfl: 0  •

## 2021-05-26 NOTE — TELEPHONE ENCOUNTER
Spoke to pharmacy to clarify SIG - per LOV dtd 6/62/42: Increase Trulicity to 3mg SQ weekly for one month then increase to 4.5mg SQ weekly,   Pharmacy stated understanding and denied further questions at this time

## 2021-06-02 ENCOUNTER — TELEPHONE (OUTPATIENT)
Dept: INTERNAL MEDICINE CLINIC | Facility: CLINIC | Age: 46
End: 2021-06-02

## 2021-06-02 ENCOUNTER — HOSPITAL ENCOUNTER (OUTPATIENT)
Dept: ENDOCRINOLOGY | Facility: HOSPITAL | Age: 46
Discharge: HOME | End: 2021-06-02
Attending: INTERNAL MEDICINE
Payer: COMMERCIAL

## 2021-06-02 VITALS — BODY MASS INDEX: 33 KG/M2 | WEIGHT: 225.19 LBS

## 2021-06-02 DIAGNOSIS — E11.65 TYPE 2 DIABETES MELLITUS WITH HYPERGLYCEMIA, WITH LONG-TERM CURRENT USE OF INSULIN (HCC): Primary | ICD-10-CM

## 2021-06-02 DIAGNOSIS — Z79.4 TYPE 2 DIABETES MELLITUS WITH HYPERGLYCEMIA, WITH LONG-TERM CURRENT USE OF INSULIN (HCC): Primary | ICD-10-CM

## 2021-06-02 NOTE — PATIENT INSTRUCTIONS
1. Aim for 90% Neisha wear - use cover for it; call Alexis to get back up Espinoza sensor TODAY; consider Dexcom if you don't like where it is on your arm   2. Give insulin for snacks, 4-6 units or snack on non carb foods.  Continue to give correction factor for

## 2021-06-05 DIAGNOSIS — E11.65 INSULIN DEPENDENT TYPE 2 DIABETES MELLITUS, UNCONTROLLED (HCC): ICD-10-CM

## 2021-06-05 DIAGNOSIS — Z79.4 INSULIN DEPENDENT TYPE 2 DIABETES MELLITUS, UNCONTROLLED (HCC): ICD-10-CM

## 2021-06-07 RX ORDER — FLASH GLUCOSE SENSOR
KIT MISCELLANEOUS
Qty: 6 EACH | Refills: 0 | Status: SHIPPED | OUTPATIENT
Start: 2021-06-07 | End: 2021-08-12

## 2021-06-07 RX ORDER — INSULIN GLARGINE 100 [IU]/ML
INJECTION, SOLUTION SUBCUTANEOUS
Qty: 45 ML | Refills: 0 | Status: SHIPPED | OUTPATIENT
Start: 2021-06-07 | End: 2021-08-12

## 2021-06-07 RX ORDER — INSULIN GLARGINE 100 [IU]/ML
INJECTION, SOLUTION SUBCUTANEOUS
Qty: 45 ML | Refills: 0 | OUTPATIENT
Start: 2021-06-07

## 2021-06-07 RX ORDER — INSULIN ASPART 100 [IU]/ML
INJECTION, SOLUTION INTRAVENOUS; SUBCUTANEOUS
Qty: 54 ML | Refills: 0 | Status: SHIPPED | OUTPATIENT
Start: 2021-06-07 | End: 2021-08-12

## 2021-06-07 NOTE — TELEPHONE ENCOUNTER
PASSED per protocol, refill sent.   Last PE 2.24.21  Future Appointments   Date Time Provider Lazaro Bain   6/14/2021  4:15 PM Anish Cabrera RD [facility] DIABETES [facility] [place] [address]   8/25/2021  4:30 PM Beena Kay MD [facility] 75[facility]

## 2021-08-12 DIAGNOSIS — E11.65 INSULIN DEPENDENT TYPE 2 DIABETES MELLITUS, UNCONTROLLED (HCC): ICD-10-CM

## 2021-08-12 DIAGNOSIS — Z79.4 INSULIN DEPENDENT TYPE 2 DIABETES MELLITUS, UNCONTROLLED (HCC): ICD-10-CM

## 2021-08-12 RX ORDER — INSULIN GLARGINE 100 [IU]/ML
INJECTION, SOLUTION SUBCUTANEOUS
Qty: 40 ML | Refills: 0 | Status: SHIPPED | OUTPATIENT
Start: 2021-08-12 | End: 2021-12-27

## 2021-08-12 RX ORDER — INSULIN ASPART 100 [IU]/ML
INJECTION, SOLUTION INTRAVENOUS; SUBCUTANEOUS
Qty: 54 ML | Refills: 0 | Status: SHIPPED | OUTPATIENT
Start: 2021-08-12 | End: 2021-12-27

## 2021-08-12 RX ORDER — DULAGLUTIDE 4.5 MG/.5ML
4.5 INJECTION, SOLUTION SUBCUTANEOUS WEEKLY
Qty: 6 ML | Refills: 0 | Status: SHIPPED | OUTPATIENT
Start: 2021-08-12 | End: 2021-10-08

## 2021-08-12 NOTE — TELEPHONE ENCOUNTER
LOV 5/24/21    RTC in 3 months no follow up scheduled. Mychart reminder sent    LR 6/24/21    Prescriptions sent per protocol.  Provider is out of the office until 8/17

## 2021-08-13 RX ORDER — ATORVASTATIN CALCIUM 40 MG/1
40 TABLET, FILM COATED ORAL NIGHTLY
Qty: 90 TABLET | Refills: 0 | Status: SHIPPED | OUTPATIENT
Start: 2021-08-13 | End: 2021-12-27

## 2021-08-14 RX ORDER — FLASH GLUCOSE SENSOR
1 KIT MISCELLANEOUS
Qty: 6 EACH | Refills: 1 | Status: SHIPPED | OUTPATIENT
Start: 2021-08-14

## 2021-08-14 RX ORDER — PEN NEEDLE, DIABETIC 32GX 5/32"
NEEDLE, DISPOSABLE MISCELLANEOUS
Qty: 300 EACH | Refills: 1 | Status: SHIPPED | OUTPATIENT
Start: 2021-08-14

## 2021-08-23 ENCOUNTER — LAB ENCOUNTER (OUTPATIENT)
Dept: LAB | Age: 46
End: 2021-08-23
Attending: INTERNAL MEDICINE
Payer: COMMERCIAL

## 2021-08-23 DIAGNOSIS — R94.6 BORDERLINE ABNORMAL TFTS: ICD-10-CM

## 2021-08-23 DIAGNOSIS — Z79.4 TYPE 2 DIABETES MELLITUS WITH HYPERGLYCEMIA, WITH LONG-TERM CURRENT USE OF INSULIN (HCC): ICD-10-CM

## 2021-08-23 DIAGNOSIS — E11.65 TYPE 2 DIABETES MELLITUS WITH HYPERGLYCEMIA, WITH LONG-TERM CURRENT USE OF INSULIN (HCC): ICD-10-CM

## 2021-08-23 LAB
ALBUMIN SERPL-MCNC: 3.5 G/DL (ref 3.4–5)
ALBUMIN/GLOB SERPL: 0.8 {RATIO} (ref 1–2)
ALP LIVER SERPL-CCNC: 91 U/L
ALT SERPL-CCNC: 25 U/L
ANION GAP SERPL CALC-SCNC: 3 MMOL/L (ref 0–18)
AST SERPL-CCNC: 16 U/L (ref 15–37)
BILIRUB SERPL-MCNC: 0.6 MG/DL (ref 0.1–2)
BUN BLD-MCNC: 6 MG/DL (ref 7–18)
CALCIUM BLD-MCNC: 9.3 MG/DL (ref 8.5–10.1)
CHLORIDE SERPL-SCNC: 106 MMOL/L (ref 98–112)
CO2 SERPL-SCNC: 27 MMOL/L (ref 21–32)
CREAT BLD-MCNC: 1.01 MG/DL
EST. AVERAGE GLUCOSE BLD GHB EST-MCNC: 214 MG/DL (ref 68–126)
GLOBULIN PLAS-MCNC: 4.3 G/DL (ref 2.8–4.4)
GLUCOSE BLD-MCNC: 145 MG/DL (ref 70–99)
HBA1C MFR BLD HPLC: 9.1 % (ref ?–5.7)
M PROTEIN MFR SERPL ELPH: 7.8 G/DL (ref 6.4–8.2)
OSMOLALITY SERPL CALC.SUM OF ELEC: 282 MOSM/KG (ref 275–295)
PATIENT FASTING Y/N/NP: YES
POTASSIUM SERPL-SCNC: 4.1 MMOL/L (ref 3.5–5.1)
SODIUM SERPL-SCNC: 136 MMOL/L (ref 136–145)
T3FREE SERPL-MCNC: 2.2 PG/ML (ref 2.4–4.2)
T4 FREE SERPL-MCNC: 0.7 NG/DL (ref 0.8–1.7)
TSI SER-ACNC: 1.68 MIU/ML (ref 0.36–3.74)

## 2021-08-23 PROCEDURE — 80053 COMPREHEN METABOLIC PANEL: CPT | Performed by: INTERNAL MEDICINE

## 2021-08-23 PROCEDURE — 3046F HEMOGLOBIN A1C LEVEL >9.0%: CPT | Performed by: INTERNAL MEDICINE

## 2021-08-23 PROCEDURE — 83036 HEMOGLOBIN GLYCOSYLATED A1C: CPT | Performed by: INTERNAL MEDICINE

## 2021-08-25 ENCOUNTER — TELEMEDICINE (OUTPATIENT)
Dept: INTERNAL MEDICINE CLINIC | Facility: CLINIC | Age: 46
End: 2021-08-25
Payer: COMMERCIAL

## 2021-08-25 DIAGNOSIS — R30.0 DYSURIA: Primary | ICD-10-CM

## 2021-08-25 DIAGNOSIS — E11.65 TYPE 2 DIABETES MELLITUS WITH HYPERGLYCEMIA, WITH LONG-TERM CURRENT USE OF INSULIN (HCC): ICD-10-CM

## 2021-08-25 DIAGNOSIS — R10.2 PELVIC PRESSURE IN FEMALE: ICD-10-CM

## 2021-08-25 DIAGNOSIS — L29.9 ITCHING: ICD-10-CM

## 2021-08-25 DIAGNOSIS — Z79.4 TYPE 2 DIABETES MELLITUS WITH HYPERGLYCEMIA, WITH LONG-TERM CURRENT USE OF INSULIN (HCC): ICD-10-CM

## 2021-08-25 PROCEDURE — 99214 OFFICE O/P EST MOD 30 MIN: CPT | Performed by: INTERNAL MEDICINE

## 2021-08-27 ENCOUNTER — LAB ENCOUNTER (OUTPATIENT)
Dept: LAB | Age: 46
End: 2021-08-27
Attending: INTERNAL MEDICINE
Payer: COMMERCIAL

## 2021-08-27 DIAGNOSIS — L29.9 ITCHING: ICD-10-CM

## 2021-08-27 DIAGNOSIS — R30.0 DYSURIA: ICD-10-CM

## 2021-08-27 DIAGNOSIS — Z79.4 TYPE 2 DIABETES MELLITUS WITH HYPERGLYCEMIA, WITH LONG-TERM CURRENT USE OF INSULIN (HCC): ICD-10-CM

## 2021-08-27 DIAGNOSIS — E11.65 TYPE 2 DIABETES MELLITUS WITH HYPERGLYCEMIA, WITH LONG-TERM CURRENT USE OF INSULIN (HCC): ICD-10-CM

## 2021-08-27 LAB
AMYLASE SERPL-CCNC: 36 U/L (ref 25–115)
BILIRUB UR QL STRIP.AUTO: NEGATIVE
CLARITY UR REFRACT.AUTO: CLEAR
COLOR UR AUTO: COLORLESS
GLUCOSE UR STRIP.AUTO-MCNC: >=500 MG/DL
KETONES UR STRIP.AUTO-MCNC: NEGATIVE MG/DL
LIPASE SERPL-CCNC: 131 U/L (ref 73–393)
NITRITE UR QL STRIP.AUTO: NEGATIVE
PH UR STRIP.AUTO: 6 [PH] (ref 5–8)
PROT UR STRIP.AUTO-MCNC: NEGATIVE MG/DL
RBC UR QL AUTO: NEGATIVE
SP GR UR STRIP.AUTO: 1 (ref 1–1.03)
UROBILINOGEN UR STRIP.AUTO-MCNC: <2 MG/DL

## 2021-08-27 PROCEDURE — 83690 ASSAY OF LIPASE: CPT | Performed by: INTERNAL MEDICINE

## 2021-08-27 PROCEDURE — 82785 ASSAY OF IGE: CPT | Performed by: INTERNAL MEDICINE

## 2021-08-27 PROCEDURE — 81001 URINALYSIS AUTO W/SCOPE: CPT | Performed by: INTERNAL MEDICINE

## 2021-08-27 PROCEDURE — 86003 ALLG SPEC IGE CRUDE XTRC EA: CPT | Performed by: INTERNAL MEDICINE

## 2021-08-27 PROCEDURE — 82150 ASSAY OF AMYLASE: CPT | Performed by: INTERNAL MEDICINE

## 2021-08-27 NOTE — PROGRESS NOTES
Due to COVID-19 ACTION PLAN, the patient's office visit was converted to a phone and video visit with informed patient consent. Time Spent:30 minutes    Subjective     HPI:   Dev Wilburn is a 40year old female who presents for uncontrolled diabetes. interactive audio and video communication.   This has been done in good fabrizio to provide continuity of care in the best interest of the provider-patient relationship, due to the on-going public health crisis/national emergency and because of restrictions of

## 2021-08-30 LAB
A ALTERNATA IGE QN: <0.1 KUA/L (ref ?–0.1)
A FUMIGATUS IGE QN: <0.1 KUA/L (ref ?–0.1)
AMER SYCAMORE IGE QN: <0.1 KUA/L (ref ?–0.1)
BERMUDA GRASS IGE QN: 0.29 KUA/L (ref ?–0.1)
BOXELDER IGE QN: <0.1 KUA/L (ref ?–0.1)
C HERBARUM IGE QN: <0.1 KUA/L (ref ?–0.1)
CALIF WALNUT IGE QN: <0.1 KUA/L (ref ?–0.1)
CAT DANDER IGE QN: <0.1 KUA/L (ref ?–0.1)
CLAM IGE QN: <0.1 KUA/L (ref ?–0.1)
CMN PIGWEED IGE QN: <0.1 KUA/L (ref ?–0.1)
CODFISH IGE QN: <0.1 KUA/L (ref ?–0.1)
COMMON RAGWEED IGE QN: <0.1 KUA/L (ref ?–0.1)
CORN IGE QN: <0.1 KUA/L (ref ?–0.1)
COTTONWOOD IGE QN: 0.35 KUA/L (ref ?–0.1)
COW MILK IGE QN: <0.1 KUA/L (ref ?–0.1)
D FARINAE IGE QN: 1.79 KUA/L (ref ?–0.1)
D PTERONYSS IGE QN: 2.53 KUA/L (ref ?–0.1)
DOG DANDER IGE QN: <0.1 KUA/L (ref ?–0.1)
EGG WHITE IGE QN: <0.1 KUA/L (ref ?–0.1)
IGE SERPL-ACNC: 114 KU/L (ref 2–214)
IGE SERPL-ACNC: 97.5 KU/L (ref 2–214)
M RACEMOSUS IGE QN: <0.1 KUA/L (ref ?–0.1)
MARSH ELDER IGE QN: 0.16 KUA/L (ref ?–0.1)
MOUSE EPITH IGE QN: <0.1 KUA/L (ref ?–0.1)
MT JUNIPER IGE QN: <0.1 KUA/L (ref ?–0.1)
P NOTATUM IGE QN: <0.1 KUA/L (ref ?–0.1)
PEANUT IGE QN: <0.1 KUA/L (ref ?–0.1)
PECAN/HICK TREE IGE QN: <0.1 KUA/L (ref ?–0.1)
ROACH IGE QN: 0.81 KUA/L (ref ?–0.1)
SALTWORT IGE QN: <0.1 KUA/L (ref ?–0.1)
SCALLOP IGE QN: <0.1 KUA/L (ref ?–0.1)
SESAME SEED IGE QN: 0.15 KUA/L (ref ?–0.1)
SHRIMP IGE QN: 1.51 KUA/L (ref ?–0.1)
SOYBEAN IGE QN: <0.1 KUA/L (ref ?–0.1)
TIMOTHY IGE QN: <0.1 KUA/L (ref ?–0.1)
WALNUT IGE QN: <0.1 KUA/L (ref ?–0.1)
WHEAT IGE QN: <0.1 KUA/L (ref ?–0.1)
WHITE ASH IGE QN: <0.1 KUA/L (ref ?–0.1)
WHITE ELM IGE QN: <0.1 KUA/L (ref ?–0.1)
WHITE MULBERRY IGE QN: <0.1 KUA/L (ref ?–0.1)
WHITE OAK IGE QN: <0.1 KUA/L (ref ?–0.1)

## 2021-09-02 ENCOUNTER — TELEPHONE (OUTPATIENT)
Dept: INTERNAL MEDICINE CLINIC | Facility: CLINIC | Age: 46
End: 2021-09-02

## 2021-09-02 DIAGNOSIS — R30.0 DYSURIA: Primary | ICD-10-CM

## 2021-09-02 RX ORDER — CEPHALEXIN 500 MG/1
500 CAPSULE ORAL 2 TIMES DAILY
Qty: 14 CAPSULE | Refills: 0 | Status: SHIPPED | OUTPATIENT
Start: 2021-09-02 | End: 2021-10-08

## 2021-09-02 NOTE — TELEPHONE ENCOUNTER
Patient notified Keflex 500mg BID for 7 days [facility]. Pt verbalizes understanding.

## 2021-09-02 NOTE — TELEPHONE ENCOUNTER
Pt's medication was never sent to [place]. Can we send asap and then let her know via phone once done?        Carroll Huertas MD   8/30/2021 10:58 AM CDT       Trace leukocytes in urine, possible infection, start keflez 500mg bid for 7 days

## 2021-10-08 ENCOUNTER — OFFICE VISIT (OUTPATIENT)
Dept: INTERNAL MEDICINE CLINIC | Facility: CLINIC | Age: 46
End: 2021-10-08
Payer: COMMERCIAL

## 2021-10-08 VITALS
RESPIRATION RATE: 16 BRPM | WEIGHT: 223 LBS | TEMPERATURE: 98 F | HEIGHT: 69 IN | DIASTOLIC BLOOD PRESSURE: 86 MMHG | SYSTOLIC BLOOD PRESSURE: 134 MMHG | BODY MASS INDEX: 33.03 KG/M2 | HEART RATE: 94 BPM

## 2021-10-08 DIAGNOSIS — E11.65 TYPE 2 DIABETES MELLITUS WITH HYPERGLYCEMIA, WITH LONG-TERM CURRENT USE OF INSULIN (HCC): ICD-10-CM

## 2021-10-08 DIAGNOSIS — Z79.4 TYPE 2 DIABETES MELLITUS WITH HYPERGLYCEMIA, WITH LONG-TERM CURRENT USE OF INSULIN (HCC): ICD-10-CM

## 2021-10-08 DIAGNOSIS — E78.00 PURE HYPERCHOLESTEROLEMIA: Primary | ICD-10-CM

## 2021-10-08 DIAGNOSIS — I10 PRIMARY HYPERTENSION: ICD-10-CM

## 2021-10-08 DIAGNOSIS — Z12.31 VISIT FOR SCREENING MAMMOGRAM: ICD-10-CM

## 2021-10-08 DIAGNOSIS — Z12.11 SCREEN FOR COLON CANCER: ICD-10-CM

## 2021-10-08 PROCEDURE — 3075F SYST BP GE 130 - 139MM HG: CPT | Performed by: INTERNAL MEDICINE

## 2021-10-08 PROCEDURE — 99214 OFFICE O/P EST MOD 30 MIN: CPT | Performed by: INTERNAL MEDICINE

## 2021-10-08 PROCEDURE — 90471 IMMUNIZATION ADMIN: CPT | Performed by: INTERNAL MEDICINE

## 2021-10-08 PROCEDURE — 90686 IIV4 VACC NO PRSV 0.5 ML IM: CPT | Performed by: INTERNAL MEDICINE

## 2021-10-08 PROCEDURE — 3079F DIAST BP 80-89 MM HG: CPT | Performed by: INTERNAL MEDICINE

## 2021-10-08 PROCEDURE — 3008F BODY MASS INDEX DOCD: CPT | Performed by: INTERNAL MEDICINE

## 2021-10-08 RX ORDER — DULAGLUTIDE 3 MG/.5ML
3 INJECTION, SOLUTION SUBCUTANEOUS WEEKLY
Qty: 12 EACH | Refills: 0 | Status: SHIPPED | OUTPATIENT
Start: 2021-10-08

## 2021-10-08 NOTE — PROGRESS NOTES
Patient presents with: Follow - Up: DD Rm 8, 1 M F/U  Refill Request: Medication      HPI:  Here for f/u , pt notes nausea on turlicity 2.8NZ, wants to go back to 3mg dose. Pt had uti, resolved with abx. Pt notes sugars have been inproving. Due for mammo. MG Oral Cap       • Continuous Blood Gluc  (FREESTYLE RYAN READER) Does not apply Device Dispense 14 day  Freestyle reader for Ryan CGM 1 Device 0   • Doxycycline Hyclate 100 MG Oral Cap  (Patient not taking: Reported on 4/27/2021 )         Physi TREAT, GASTRO (INTERNAL)  DIABETIC EDUCATION - INTERNAL  FLULAVAL INFLUENZA VACCINE QUAD PRESERVATIVE FREE 0.5 ML  [facility] ANGEL 2D+3D SCREENING BILAT (CPT=77067/21890)    No follow-ups on file. There are no Patient Instructions on file for this visit.     All

## 2021-10-11 ENCOUNTER — TELEPHONE (OUTPATIENT)
Dept: ENDOCRINOLOGY CLINIC | Facility: CLINIC | Age: 46
End: 2021-10-11

## 2021-10-26 ENCOUNTER — TELEPHONE (OUTPATIENT)
Dept: INTERNAL MEDICINE CLINIC | Facility: CLINIC | Age: 46
End: 2021-10-26

## 2021-10-26 DIAGNOSIS — R10.9 ABDOMINAL PRESSURE: Primary | ICD-10-CM

## 2021-10-26 DIAGNOSIS — R30.0 BURNING WITH URINATION: ICD-10-CM

## 2021-10-26 NOTE — TELEPHONE ENCOUNTER
Spoke with pt. Pt said she had pressure in lower abdomen and some discomfort with urination roughly 1.5 months ago. Had UA done on 8/27/21 and was put on [facility] afterwards due to trace amounts of leukocytes in the urine.  Symptoms improved after antibiotics

## 2021-10-26 NOTE — TELEPHONE ENCOUNTER
Pt called asking if she should get another UA done or come in for another appt-she is having urine issues again-burning at times again and just discomfort-not sure if she should get UA done first? Call to advise

## 2021-10-27 NOTE — TELEPHONE ENCOUNTER
Routing to provider. Would you like CT with or without contrast? Ok to see other provider? Don't see any availability on your schedule.

## 2021-10-27 NOTE — TELEPHONE ENCOUNTER
Rpt ua with culture, but also needs to have imaging at this point, ct abd and pelvis to see if bowel of ovaries involved. Should be seen for this in the office.

## 2021-10-28 NOTE — TELEPHONE ENCOUNTER
Spoke with pt. Informed her of the need to get UA and CT. Informed her that the CT is pending and can take up to 7 business days to get approved. Gave central scheduling number to call and schedule once approved. Informed pt she needs to come in for OV.  Sh

## 2021-10-29 NOTE — TELEPHONE ENCOUNTER
[facility] message sent per pt request as she said it will be easier for her to get to. Informed her to call and schedule with Dr. Refugio Wilson or partner (ok per Dr. Sloan Safe.  Reinforced education on the need to get UA completed and CT scan once approved by her ins

## 2021-11-05 NOTE — TELEPHONE ENCOUNTER
Future Appointments   Date Time Provider Lazaro Bain   12/6/2021  1:00 PM Donte Garcia RN [facility] DIABETES [facility]   12/10/2021  1:30 PM Carlos Bain MD [facility] 75[facility]

## 2021-11-17 ENCOUNTER — TELEPHONE (OUTPATIENT)
Dept: ENDOCRINOLOGY | Facility: HOSPITAL | Age: 46
End: 2021-11-17

## 2021-11-17 DIAGNOSIS — E11.65 TYPE 2 DIABETES MELLITUS WITH HYPERGLYCEMIA, WITH LONG-TERM CURRENT USE OF INSULIN (HCC): Primary | ICD-10-CM

## 2021-11-17 DIAGNOSIS — Z79.4 TYPE 2 DIABETES MELLITUS WITH HYPERGLYCEMIA, WITH LONG-TERM CURRENT USE OF INSULIN (HCC): Primary | ICD-10-CM

## 2021-11-17 NOTE — TELEPHONE ENCOUNTER
Your patient Danielle Vides (7/1/1975) [facility]. She is interested in reviewing different CGM devices on the market. She also indicated she wishes to review her blood sugar values.   Her muluen

## 2021-12-06 ENCOUNTER — APPOINTMENT (OUTPATIENT)
Dept: ENDOCRINOLOGY | Facility: HOSPITAL | Age: 46
End: 2021-12-06
Attending: INTERNAL MEDICINE
Payer: COMMERCIAL

## 2021-12-10 ENCOUNTER — TELEMEDICINE (OUTPATIENT)
Dept: INTERNAL MEDICINE CLINIC | Facility: CLINIC | Age: 46
End: 2021-12-10
Payer: COMMERCIAL

## 2021-12-10 DIAGNOSIS — E78.00 PURE HYPERCHOLESTEROLEMIA: Primary | ICD-10-CM

## 2021-12-10 DIAGNOSIS — Z79.4 TYPE 2 DIABETES MELLITUS WITH HYPERGLYCEMIA, WITH LONG-TERM CURRENT USE OF INSULIN (HCC): ICD-10-CM

## 2021-12-10 DIAGNOSIS — E11.65 TYPE 2 DIABETES MELLITUS WITH HYPERGLYCEMIA, WITH LONG-TERM CURRENT USE OF INSULIN (HCC): ICD-10-CM

## 2021-12-10 DIAGNOSIS — I10 PRIMARY HYPERTENSION: ICD-10-CM

## 2021-12-10 PROCEDURE — 99214 OFFICE O/P EST MOD 30 MIN: CPT | Performed by: INTERNAL MEDICINE

## 2021-12-10 NOTE — PROGRESS NOTES
Due to COVID-19 ACTION PLAN, the patient's office visit was converted to a phone and video visit with informed patient consent.    Time Spent:30 min    Subjective     HPI:   Yu Diaz is a 40year old female who presents for f/u of dm2, htn, high chol radiology tests and decision making. Appropriate medical decision-making and tests are ordered as detailed in the plan of care above. Per Mccann

## 2021-12-27 ENCOUNTER — APPOINTMENT (OUTPATIENT)
Dept: GENERAL RADIOLOGY | Age: 46
End: 2021-12-27
Attending: EMERGENCY MEDICINE
Payer: COMMERCIAL

## 2021-12-27 ENCOUNTER — HOSPITAL ENCOUNTER (EMERGENCY)
Age: 46
Discharge: HOME | End: 2021-12-28
Attending: EMERGENCY MEDICINE
Payer: COMMERCIAL

## 2021-12-27 DIAGNOSIS — U07.1 COVID-19: Primary | ICD-10-CM

## 2021-12-27 DIAGNOSIS — Z79.4 INSULIN DEPENDENT TYPE 2 DIABETES MELLITUS, UNCONTROLLED (HCC): ICD-10-CM

## 2021-12-27 DIAGNOSIS — E11.65 INSULIN DEPENDENT TYPE 2 DIABETES MELLITUS, UNCONTROLLED (HCC): ICD-10-CM

## 2021-12-27 LAB
ALBUMIN SERPL-MCNC: 3.7 G/DL (ref 3.4–5)
ALBUMIN/GLOB SERPL: 0.8 {RATIO} (ref 1–2)
ALP LIVER SERPL-CCNC: 98 U/L
ALT SERPL-CCNC: 35 U/L
ANION GAP SERPL CALC-SCNC: 5 MMOL/L (ref 0–18)
AST SERPL-CCNC: 19 U/L (ref 15–37)
BASOPHILS # BLD AUTO: 0.03 X10(3) UL (ref 0–0.2)
BASOPHILS NFR BLD AUTO: 0.4 %
BILIRUB SERPL-MCNC: 0.2 MG/DL (ref 0.1–2)
BUN BLD-MCNC: 11 MG/DL (ref 7–18)
CALCIUM BLD-MCNC: 8.8 MG/DL (ref 8.5–10.1)
CHLORIDE SERPL-SCNC: 107 MMOL/L (ref 98–112)
CO2 SERPL-SCNC: 26 MMOL/L (ref 21–32)
CREAT BLD-MCNC: 0.85 MG/DL
D DIMER PPP FEU-MCNC: 0.34 UG/ML FEU (ref ?–0.5)
EOSINOPHIL # BLD AUTO: 0.31 X10(3) UL (ref 0–0.7)
EOSINOPHIL NFR BLD AUTO: 4.3 %
ERYTHROCYTE [DISTWIDTH] IN BLOOD BY AUTOMATED COUNT: 14.8 %
GLOBULIN PLAS-MCNC: 4.5 G/DL (ref 2.8–4.4)
GLUCOSE BLD-MCNC: 172 MG/DL (ref 70–99)
HCT VFR BLD AUTO: 39.7 %
HGB BLD-MCNC: 12.4 G/DL
IMM GRANULOCYTES # BLD AUTO: 0.01 X10(3) UL (ref 0–1)
IMM GRANULOCYTES NFR BLD: 0.1 %
LYMPHOCYTES # BLD AUTO: 1.89 X10(3) UL (ref 1–4)
LYMPHOCYTES NFR BLD AUTO: 26.5 %
MCH RBC QN AUTO: 26.8 PG (ref 26–34)
MCHC RBC AUTO-ENTMCNC: 31.2 G/DL (ref 31–37)
MCV RBC AUTO: 85.7 FL
MONOCYTES # BLD AUTO: 0.56 X10(3) UL (ref 0.1–1)
MONOCYTES NFR BLD AUTO: 7.8 %
NEUTROPHILS # BLD AUTO: 4.34 X10 (3) UL (ref 1.5–7.7)
NEUTROPHILS # BLD AUTO: 4.34 X10(3) UL (ref 1.5–7.7)
NEUTROPHILS NFR BLD AUTO: 60.9 %
OSMOLALITY SERPL CALC.SUM OF ELEC: 289 MOSM/KG (ref 275–295)
PLATELET # BLD AUTO: 323 10(3)UL (ref 150–450)
POTASSIUM SERPL-SCNC: 3.8 MMOL/L (ref 3.5–5.1)
PROT SERPL-MCNC: 8.2 G/DL (ref 6.4–8.2)
RBC # BLD AUTO: 4.63 X10(6)UL
SODIUM SERPL-SCNC: 138 MMOL/L (ref 136–145)
TROPONIN I HIGH SENSITIVITY: <3 NG/L
WBC # BLD AUTO: 7.1 X10(3) UL (ref 4–11)

## 2021-12-27 PROCEDURE — 36415 COLL VENOUS BLD VENIPUNCTURE: CPT

## 2021-12-27 PROCEDURE — 99284 EMERGENCY DEPT VISIT MOD MDM: CPT

## 2021-12-27 PROCEDURE — 84484 ASSAY OF TROPONIN QUANT: CPT | Performed by: EMERGENCY MEDICINE

## 2021-12-27 PROCEDURE — 93010 ELECTROCARDIOGRAM REPORT: CPT

## 2021-12-27 PROCEDURE — 85025 COMPLETE CBC W/AUTO DIFF WBC: CPT | Performed by: EMERGENCY MEDICINE

## 2021-12-27 PROCEDURE — 85379 FIBRIN DEGRADATION QUANT: CPT | Performed by: EMERGENCY MEDICINE

## 2021-12-27 PROCEDURE — 93005 ELECTROCARDIOGRAM TRACING: CPT

## 2021-12-27 PROCEDURE — 71045 X-RAY EXAM CHEST 1 VIEW: CPT | Performed by: EMERGENCY MEDICINE

## 2021-12-27 PROCEDURE — 80053 COMPREHEN METABOLIC PANEL: CPT | Performed by: EMERGENCY MEDICINE

## 2021-12-27 RX ORDER — ATORVASTATIN CALCIUM 40 MG/1
40 TABLET, FILM COATED ORAL NIGHTLY
Qty: 90 TABLET | Refills: 0 | Status: CANCELLED | OUTPATIENT
Start: 2021-12-27

## 2021-12-27 RX ORDER — ALBUTEROL SULFATE 90 UG/1
2 AEROSOL, METERED RESPIRATORY (INHALATION) EVERY 4 HOURS PRN
Qty: 1 EACH | Refills: 0 | Status: SHIPPED | OUTPATIENT
Start: 2021-12-27 | End: 2022-01-26

## 2021-12-27 RX ORDER — ATORVASTATIN CALCIUM 40 MG/1
TABLET, FILM COATED ORAL
Qty: 90 TABLET | Refills: 0 | Status: SHIPPED | OUTPATIENT
Start: 2021-12-27

## 2021-12-27 NOTE — TELEPHONE ENCOUNTER
Last Tele: 5/24/21  RTC: 3 Months - No FU Appt    MyChart Message sent as well to book an appointment

## 2021-12-27 NOTE — TELEPHONE ENCOUNTER
PASSED per protocol, refill sent.   Last PE 2.24.21  Future Appointments   Date Time Provider Lazaro Bain   12/29/2021  5:00 PM [address] [facility]   12/30/2021  6:00 PM DAMON PET CT/CT RCKDI  K   1/19/2022 11:00 AM PF SLOAN RM1 PF MAMMO

## 2021-12-28 VITALS
SYSTOLIC BLOOD PRESSURE: 129 MMHG | DIASTOLIC BLOOD PRESSURE: 85 MMHG | HEIGHT: 69 IN | HEART RATE: 103 BPM | RESPIRATION RATE: 22 BRPM | OXYGEN SATURATION: 100 % | TEMPERATURE: 98 F | BODY MASS INDEX: 31.99 KG/M2 | WEIGHT: 216 LBS

## 2021-12-28 LAB
ATRIAL RATE: 101 BPM
P AXIS: 59 DEGREES
P-R INTERVAL: 162 MS
Q-T INTERVAL: 360 MS
QRS DURATION: 80 MS
QTC CALCULATION (BEZET): 466 MS
R AXIS: 56 DEGREES
T AXIS: 54 DEGREES
VENTRICULAR RATE: 101 BPM

## 2021-12-28 NOTE — ED PROVIDER NOTES
Patient Seen in: THE [facility] [place]      History   Patient presents with:  Difficulty Breathing    Stated Complaint: SHAGGY - COVID + TODAY WITH RAPID - SATS @100 %     Subjective:   HPI    30-year-old female with a history of diabete nontender. Extremities: No evidence of deformity. No clubbing or cyanosis. Neuro: No focal deficit is noted.   ED Course     Labs Reviewed   COMP METABOLIC PANEL (14) - Abnormal; Notable for the following components:       Result Value    Glucose 172 (*) results and their discharge instructions. All questions were answered. MDM      Patient was screened and evaluated during this visit.    As a treating physician attending to the patient, I determined, within reasonable clinical confidence and prior

## 2021-12-29 ENCOUNTER — TELEPHONE (OUTPATIENT)
Dept: INTERNAL MEDICINE CLINIC | Facility: CLINIC | Age: 46
End: 2021-12-29

## 2021-12-29 NOTE — TELEPHONE ENCOUNTER
Rx request for Lantus, please review and sign off if appropriate. Thank you.     Last Tele: 5/24/21  RTC: 3 Months - No FU Appt

## 2021-12-29 NOTE — TELEPHONE ENCOUNTER
Pt calling wanting to speak with AS. PCR test came back positive today and pt is wanting to discuss symptoms and possible medications. Ok to add on VV?

## 2021-12-29 NOTE — TELEPHONE ENCOUNTER
Rx request for Novolog Flexpen 100 unit/ML. Please review and sign off if appropriate. Thank you. Last Tele: 5/24/21  RTC: 3 Months - No FU Appt    [employer] message sent to patient as well.

## 2021-12-30 ENCOUNTER — TELEMEDICINE (OUTPATIENT)
Dept: INTERNAL MEDICINE CLINIC | Facility: CLINIC | Age: 46
End: 2021-12-30
Payer: COMMERCIAL

## 2021-12-30 DIAGNOSIS — U07.1 COVID: Primary | ICD-10-CM

## 2021-12-30 PROCEDURE — 99213 OFFICE O/P EST LOW 20 MIN: CPT | Performed by: INTERNAL MEDICINE

## 2021-12-30 RX ORDER — INSULIN GLARGINE 100 [IU]/ML
INJECTION, SOLUTION SUBCUTANEOUS
Qty: 36 ML | Refills: 0 | Status: SHIPPED | OUTPATIENT
Start: 2021-12-30

## 2021-12-30 RX ORDER — INSULIN GLARGINE 100 [IU]/ML
INJECTION, SOLUTION SUBCUTANEOUS
Qty: 40 ML | Refills: 0 | Status: SHIPPED | OUTPATIENT
Start: 2021-12-30

## 2021-12-30 RX ORDER — INSULIN ASPART 100 [IU]/ML
INJECTION, SOLUTION INTRAVENOUS; SUBCUTANEOUS
Qty: 45 ML | Refills: 0 | Status: SHIPPED | OUTPATIENT
Start: 2021-12-30

## 2021-12-30 RX ORDER — INSULIN ASPART 100 [IU]/ML
INJECTION, SOLUTION INTRAVENOUS; SUBCUTANEOUS
Qty: 54 ML | Refills: 0 | Status: SHIPPED | OUTPATIENT
Start: 2021-12-30

## 2021-12-30 NOTE — PATIENT INSTRUCTIONS
You have tested Positive for COVID-19 (Isolate)  Everyone, regardless of vaccination status. Stay home and mask for 5 days. If you have no symptoms or your symptoms are resolving after 5 days, you can leave your house.   Continue to wear a mask around

## 2021-12-30 NOTE — PROGRESS NOTES
Visit for Respiratory Illness - Potential COVID-19 Infection  Subjective    This visit is conducted using Telemedicine with live, interactive video and audio.     Chief Complaint:  Concern for respiratory illness (including COVID-19 and influenza)    HPI: symptomatic treatment as outlined on CDC Patient Guidelines. Raheel Jameson understands phone evaluation is not a substitute for face-to-face examination or emergency care. Patient advised to go to ER or call 911 for worsening symptoms or acute distress.

## 2021-12-30 NOTE — TELEPHONE ENCOUNTER
Future Appointments   Date Time Provider Lazaro Bain   12/30/2021  1:00 PM Jey Montalvo MD [facility] 75[facility]   1/19/2022 11:00 AM PF SLOAN RM1 PF MAMMO [place]   1/20/2022  4:30  [address]   4/13/2022  5:30 PM Jey Montalvo

## 2022-02-02 RX ORDER — ATORVASTATIN CALCIUM 40 MG/1
TABLET, FILM COATED ORAL
Qty: 90 TABLET | Refills: 0 | OUTPATIENT
Start: 2022-02-02

## 2022-02-03 ENCOUNTER — OFFICE VISIT (OUTPATIENT)
Dept: ENDOCRINOLOGY CLINIC | Facility: CLINIC | Age: 47
End: 2022-02-03
Payer: COMMERCIAL

## 2022-02-03 VITALS
HEART RATE: 99 BPM | DIASTOLIC BLOOD PRESSURE: 86 MMHG | BODY MASS INDEX: 33 KG/M2 | WEIGHT: 225 LBS | SYSTOLIC BLOOD PRESSURE: 132 MMHG

## 2022-02-03 DIAGNOSIS — IMO0002 INSULIN DEPENDENT TYPE 2 DIABETES MELLITUS, UNCONTROLLED: Primary | ICD-10-CM

## 2022-02-03 DIAGNOSIS — E04.1 THYROID NODULE: ICD-10-CM

## 2022-02-03 LAB
CARTRIDGE LOT#: ABNORMAL NUMERIC
GLUCOSE BLOOD: 81
HEMOGLOBIN A1C: 9.2 % (ref 4.3–5.6)
TEST STRIP LOT #: NORMAL NUMERIC

## 2022-02-03 PROCEDURE — 99214 OFFICE O/P EST MOD 30 MIN: CPT | Performed by: INTERNAL MEDICINE

## 2022-02-03 PROCEDURE — 95251 CONT GLUC MNTR ANALYSIS I&R: CPT | Performed by: INTERNAL MEDICINE

## 2022-02-03 PROCEDURE — 3079F DIAST BP 80-89 MM HG: CPT | Performed by: INTERNAL MEDICINE

## 2022-02-03 PROCEDURE — 3075F SYST BP GE 130 - 139MM HG: CPT | Performed by: INTERNAL MEDICINE

## 2022-02-03 PROCEDURE — 3046F HEMOGLOBIN A1C LEVEL >9.0%: CPT | Performed by: FAMILY MEDICINE

## 2022-02-03 PROCEDURE — 36416 COLLJ CAPILLARY BLOOD SPEC: CPT | Performed by: INTERNAL MEDICINE

## 2022-02-03 PROCEDURE — 83036 HEMOGLOBIN GLYCOSYLATED A1C: CPT | Performed by: INTERNAL MEDICINE

## 2022-02-03 RX ORDER — INSULIN DEGLUDEC INJECTION 100 U/ML
34 INJECTION, SOLUTION SUBCUTANEOUS DAILY
Qty: 45 ML | Refills: 2 | Status: SHIPPED | OUTPATIENT
Start: 2022-02-03

## 2022-02-03 NOTE — PATIENT INSTRUCTIONS
DECREASE Lantus to 34 units subcutaneous daily    INCREASE Novolog to 22 units subcutaneous breakfast to 22 units subcutaneous in AM; 12 units subcutaneous lunch; 28 units subcutaneous dinner

## 2022-02-10 ENCOUNTER — HOSPITAL ENCOUNTER (OUTPATIENT)
Dept: ULTRASOUND IMAGING | Facility: HOSPITAL | Age: 47
Discharge: HOME | End: 2022-02-10
Attending: INTERNAL MEDICINE
Payer: COMMERCIAL

## 2022-02-10 DIAGNOSIS — R10.2 PELVIC PRESSURE IN FEMALE: ICD-10-CM

## 2022-02-10 PROCEDURE — 76770 US EXAM ABDO BACK WALL COMP: CPT | Performed by: INTERNAL MEDICINE

## 2022-02-15 ENCOUNTER — OFFICE VISIT (OUTPATIENT)
Dept: INTERNAL MEDICINE CLINIC | Facility: CLINIC | Age: 47
End: 2022-02-15
Payer: COMMERCIAL

## 2022-02-15 VITALS
HEART RATE: 80 BPM | SYSTOLIC BLOOD PRESSURE: 128 MMHG | HEIGHT: 69 IN | BODY MASS INDEX: 33.77 KG/M2 | WEIGHT: 228 LBS | RESPIRATION RATE: 12 BRPM | TEMPERATURE: 98 F | DIASTOLIC BLOOD PRESSURE: 74 MMHG | OXYGEN SATURATION: 98 %

## 2022-02-15 DIAGNOSIS — M54.50 ACUTE MIDLINE LOW BACK PAIN WITHOUT SCIATICA: Primary | ICD-10-CM

## 2022-02-15 PROCEDURE — 3074F SYST BP LT 130 MM HG: CPT | Performed by: FAMILY MEDICINE

## 2022-02-15 PROCEDURE — 99213 OFFICE O/P EST LOW 20 MIN: CPT | Performed by: FAMILY MEDICINE

## 2022-02-15 PROCEDURE — 3078F DIAST BP <80 MM HG: CPT | Performed by: FAMILY MEDICINE

## 2022-02-15 PROCEDURE — 3008F BODY MASS INDEX DOCD: CPT | Performed by: FAMILY MEDICINE

## 2022-02-15 RX ORDER — CYCLOBENZAPRINE HCL 10 MG
10 TABLET ORAL NIGHTLY PRN
Qty: 20 TABLET | Refills: 0 | Status: SHIPPED | OUTPATIENT
Start: 2022-02-15

## 2022-02-18 ENCOUNTER — TELEPHONE (OUTPATIENT)
Dept: INTERNAL MEDICINE CLINIC | Facility: CLINIC | Age: 47
End: 2022-02-18

## 2022-04-04 ENCOUNTER — TELEPHONE (OUTPATIENT)
Dept: INTERNAL MEDICINE CLINIC | Facility: CLINIC | Age: 47
End: 2022-04-04

## 2022-04-04 RX ORDER — INSULIN ASPART 100 [IU]/ML
INJECTION, SOLUTION INTRAVENOUS; SUBCUTANEOUS
Qty: 54 ML | Refills: 0 | Status: SHIPPED | OUTPATIENT
Start: 2022-04-04

## 2022-04-04 NOTE — TELEPHONE ENCOUNTER
Ok to update clinical information as requested? Patient Review of Clinical Information    Problems   The patient or proxy has not reviewed this information. Medications   The patient or proxy has not reviewed this information, and there are updates pending:   Requested Medication Removals Start Date Reported By  Comments   minocycline 100 MG Caps 9/23/2020 Marie Tillman    doxycycline 100 MG Caps 7/18/2019 Marie Tillman      Allergies   The patient or proxy has not reviewed this information.

## 2022-04-13 ENCOUNTER — HOSPITAL ENCOUNTER (OUTPATIENT)
Dept: ULTRASOUND IMAGING | Age: 47
Discharge: HOME | End: 2022-04-13
Attending: INTERNAL MEDICINE
Payer: COMMERCIAL

## 2022-04-13 ENCOUNTER — HOSPITAL ENCOUNTER (OUTPATIENT)
Dept: MAMMOGRAPHY | Age: 47
Discharge: HOME | End: 2022-04-13
Attending: INTERNAL MEDICINE
Payer: COMMERCIAL

## 2022-04-13 ENCOUNTER — LAB ENCOUNTER (OUTPATIENT)
Dept: LAB | Age: 47
End: 2022-04-13
Attending: INTERNAL MEDICINE
Payer: COMMERCIAL

## 2022-04-13 DIAGNOSIS — Z12.31 VISIT FOR SCREENING MAMMOGRAM: ICD-10-CM

## 2022-04-13 DIAGNOSIS — E04.1 THYROID NODULE: ICD-10-CM

## 2022-04-13 LAB
BILIRUB UR QL STRIP.AUTO: NEGATIVE
CLARITY UR REFRACT.AUTO: CLEAR
COLOR UR AUTO: YELLOW
GLUCOSE UR STRIP.AUTO-MCNC: >=500 MG/DL
HM MAMMOGRAPHY BILATERAL: NORMAL
KETONES UR STRIP.AUTO-MCNC: NEGATIVE MG/DL
LEUKOCYTE ESTERASE UR QL STRIP.AUTO: NEGATIVE
NITRITE UR QL STRIP.AUTO: NEGATIVE
PH UR STRIP.AUTO: 5 [PH] (ref 5–8)
PROT UR STRIP.AUTO-MCNC: NEGATIVE MG/DL
RBC UR QL AUTO: NEGATIVE
SP GR UR STRIP.AUTO: 1.01 (ref 1–1.03)
T3FREE SERPL-MCNC: 2.54 PG/ML (ref 2.4–4.2)
T4 FREE SERPL-MCNC: 0.8 NG/DL (ref 0.8–1.7)
TSI SER-ACNC: 2.44 MIU/ML (ref 0.36–3.74)
UROBILINOGEN UR STRIP.AUTO-MCNC: <2 MG/DL

## 2022-04-13 PROCEDURE — 36415 COLL VENOUS BLD VENIPUNCTURE: CPT

## 2022-04-13 PROCEDURE — 84443 ASSAY THYROID STIM HORMONE: CPT

## 2022-04-13 PROCEDURE — 84439 ASSAY OF FREE THYROXINE: CPT

## 2022-04-13 PROCEDURE — 77067 SCR MAMMO BI INCL CAD: CPT | Performed by: INTERNAL MEDICINE

## 2022-04-13 PROCEDURE — 77063 BREAST TOMOSYNTHESIS BI: CPT | Performed by: INTERNAL MEDICINE

## 2022-04-13 PROCEDURE — 84481 FREE ASSAY (FT-3): CPT

## 2022-04-13 PROCEDURE — 81003 URINALYSIS AUTO W/O SCOPE: CPT | Performed by: INTERNAL MEDICINE

## 2022-04-13 PROCEDURE — 76536 US EXAM OF HEAD AND NECK: CPT | Performed by: INTERNAL MEDICINE

## 2022-04-25 ENCOUNTER — OFFICE VISIT (OUTPATIENT)
Dept: INTERNAL MEDICINE CLINIC | Facility: CLINIC | Age: 47
End: 2022-04-25
Payer: COMMERCIAL

## 2022-04-25 VITALS
DIASTOLIC BLOOD PRESSURE: 84 MMHG | HEIGHT: 69 IN | HEART RATE: 88 BPM | OXYGEN SATURATION: 98 % | TEMPERATURE: 97 F | RESPIRATION RATE: 12 BRPM | BODY MASS INDEX: 34.66 KG/M2 | WEIGHT: 234 LBS | SYSTOLIC BLOOD PRESSURE: 140 MMHG

## 2022-04-25 DIAGNOSIS — Z79.4 TYPE 2 DIABETES MELLITUS WITH HYPERGLYCEMIA, WITH LONG-TERM CURRENT USE OF INSULIN (HCC): ICD-10-CM

## 2022-04-25 DIAGNOSIS — Z12.11 SCREENING FOR MALIGNANT NEOPLASM OF COLON: Primary | ICD-10-CM

## 2022-04-25 DIAGNOSIS — E78.00 PURE HYPERCHOLESTEROLEMIA: ICD-10-CM

## 2022-04-25 DIAGNOSIS — E11.65 TYPE 2 DIABETES MELLITUS WITH HYPERGLYCEMIA, WITH LONG-TERM CURRENT USE OF INSULIN (HCC): ICD-10-CM

## 2022-04-25 DIAGNOSIS — I10 PRIMARY HYPERTENSION: ICD-10-CM

## 2022-04-25 DIAGNOSIS — Z12.31 ENCOUNTER FOR SCREENING MAMMOGRAM FOR MALIGNANT NEOPLASM OF BREAST: ICD-10-CM

## 2022-04-25 PROCEDURE — 99214 OFFICE O/P EST MOD 30 MIN: CPT | Performed by: INTERNAL MEDICINE

## 2022-04-25 PROCEDURE — 3008F BODY MASS INDEX DOCD: CPT | Performed by: INTERNAL MEDICINE

## 2022-04-25 PROCEDURE — 3077F SYST BP >= 140 MM HG: CPT | Performed by: INTERNAL MEDICINE

## 2022-04-25 PROCEDURE — 3079F DIAST BP 80-89 MM HG: CPT | Performed by: INTERNAL MEDICINE

## 2022-04-25 RX ORDER — DULAGLUTIDE 1.5 MG/.5ML
1.5 INJECTION, SOLUTION SUBCUTANEOUS WEEKLY
Qty: 4 EACH | Refills: 1 | Status: SHIPPED | OUTPATIENT
Start: 2022-04-25

## 2022-04-27 RX ORDER — ATORVASTATIN CALCIUM 40 MG/1
TABLET, FILM COATED ORAL
Qty: 90 TABLET | Refills: 0 | Status: SHIPPED | OUTPATIENT
Start: 2022-04-27

## 2022-04-27 RX ORDER — INSULIN ASPART 100 [IU]/ML
INJECTION, SOLUTION INTRAVENOUS; SUBCUTANEOUS
Qty: 54 ML | Refills: 0 | Status: SHIPPED | OUTPATIENT
Start: 2022-04-27

## 2022-04-27 NOTE — TELEPHONE ENCOUNTER
Last OV: 4/25/22 DM- A.S. Last PE: 2/24/21- A. S. Future Appointments   Date Time Provider Lazaro Taya   5/9/2022 10:00 AM [address]   5/26/2022  2:30 PM Jodee Ingram MD [address] [place] [address]        Latest labs: 2/23/21 Lipid    Latest RX: atorvastatin- 90 tabs 0 refills 12/27/21    Per protocol, failed due to Lipid panel within past 12 months. Rx pending.

## 2022-05-16 RX ORDER — PEN NEEDLE, DIABETIC 32GX 5/32"
1 NEEDLE, DISPOSABLE MISCELLANEOUS 4 TIMES DAILY
Qty: 300 EACH | Refills: 1 | Status: SHIPPED | OUTPATIENT
Start: 2022-05-16 | End: 2022-06-15

## 2022-05-16 RX ORDER — FLASH GLUCOSE SENSOR
KIT MISCELLANEOUS
Qty: 1 EACH | Refills: 0 | Status: SHIPPED | OUTPATIENT
Start: 2022-05-16

## 2022-05-16 RX ORDER — ATORVASTATIN CALCIUM 40 MG/1
40 TABLET, FILM COATED ORAL NIGHTLY
Qty: 90 TABLET | Refills: 0 | OUTPATIENT
Start: 2022-05-16

## 2022-05-16 RX ORDER — FLASH GLUCOSE SENSOR
1 KIT MISCELLANEOUS
Qty: 6 EACH | Refills: 0 | Status: SHIPPED | OUTPATIENT
Start: 2022-05-16

## 2022-05-16 NOTE — TELEPHONE ENCOUNTER
Last VISIT - 4/25/22 DM f/u    Last CPE - 2/24/21    Last REFILL -     ATORVASTATIN 40 MG Oral Tab 90 tablet 0 4/27/2022     Last LABS - 12/27/21 cbc, cmp    Per PROTOCOL? FAILED    Please Approve or Deny.

## 2022-05-19 RX ORDER — PEN NEEDLE, DIABETIC 32GX 5/32"
1 NEEDLE, DISPOSABLE MISCELLANEOUS 4 TIMES DAILY
Qty: 300 EACH | Refills: 1 | Status: SHIPPED | OUTPATIENT
Start: 2022-05-19 | End: 2022-06-18

## 2022-05-26 ENCOUNTER — OFFICE VISIT (OUTPATIENT)
Dept: ENDOCRINOLOGY CLINIC | Facility: CLINIC | Age: 47
End: 2022-05-26
Payer: COMMERCIAL

## 2022-05-26 ENCOUNTER — TELEPHONE (OUTPATIENT)
Dept: ENDOCRINOLOGY CLINIC | Facility: CLINIC | Age: 47
End: 2022-05-26

## 2022-05-26 VITALS
HEART RATE: 115 BPM | BODY MASS INDEX: 34 KG/M2 | DIASTOLIC BLOOD PRESSURE: 97 MMHG | WEIGHT: 230 LBS | SYSTOLIC BLOOD PRESSURE: 159 MMHG

## 2022-05-26 DIAGNOSIS — E11.65 UNCONTROLLED TYPE 2 DIABETES MELLITUS WITH HYPERGLYCEMIA (HCC): Primary | ICD-10-CM

## 2022-05-26 LAB
CARTRIDGE LOT#: ABNORMAL NUMERIC
GLUCOSE BLOOD: 246
HEMOGLOBIN A1C: 9.2 % (ref 4.3–5.6)
TEST STRIP LOT #: NORMAL NUMERIC

## 2022-05-26 NOTE — TELEPHONE ENCOUNTER
Dr. Abbie Hendricks, [facility])  F/U switched to 4:45pm today (spot open) - she stated she has something at son's school   Thanks

## 2022-06-01 ENCOUNTER — TELEPHONE (OUTPATIENT)
Dept: ENDOCRINOLOGY CLINIC | Facility: CLINIC | Age: 47
End: 2022-06-01

## 2022-06-01 NOTE — TELEPHONE ENCOUNTER
dapagliflozin (FARXIGA) 10 MG Oral Tab, Take 1 tablet (10 mg total) by mouth daily. , Disp: 90 tablet, Rfl: 1    Key: [id number]

## 2022-06-02 NOTE — TELEPHONE ENCOUNTER
Medication PA Requested: dapagliflozin (FARXIGA) 10 MG Oral Tab                                                         CoverMyMeds Used:  Key:Key: [id number]  Quantity:90 tablet Rfls 1  Day Supply:  Sig: Take 1 tablet (10 mg total) by mouth daily. DX Code: E11.65        Faxed [employer] PA form with OV/A1c 5/26/2022, lipid 2/2021. Awaiting determination.

## 2022-06-02 NOTE — TELEPHONE ENCOUNTER
Medication PA Requested: dapagliflozin (FARXIGA) 10 MG Oral Tab                                                         CoverMyMeds Used:  Key:  Quantity:90 tablet Rfls 1  Day Supply:  Sig: Take 1 tablet (10 mg total) by mouth daily.   DX Code: E11.65                                    CPT code (if applicable):   Case Number/Pending Ref#:

## 2022-06-08 ENCOUNTER — PATIENT MESSAGE (OUTPATIENT)
Dept: ENDOCRINOLOGY CLINIC | Facility: CLINIC | Age: 47
End: 2022-06-08

## 2022-06-08 NOTE — TELEPHONE ENCOUNTER
Torrie Armando [phone], spoke with Abilio Cisneros. He states was approved on 6/6/2022 for 2 years, until 6/5/2024, auth # \"none\" they do not provide those. He states approval letter faxed to office. Requested re-fax and he will send to  [phone]. Call ref # none per Abilio Cisneros. 1 [address], [phone] spoke with dago Frias. She states Kim Ribera went through.      My chart message sent to patient of approval.

## 2022-06-14 NOTE — TELEPHONE ENCOUNTER
Received notification that patient has not read [employer] message. Pharmacy also confirmed patient has not picked up the medication.     RN called patient and informed her of the approval.

## 2022-06-16 NOTE — TELEPHONE ENCOUNTER
Received a fax from [facility] stating that the [place] has been approved effective 06/06/2022 and ends 06/05/2024.  RN contacted pt of approval, fax sent to scanning

## 2022-06-24 ENCOUNTER — HOSPITAL ENCOUNTER (OUTPATIENT)
Dept: ULTRASOUND IMAGING | Age: 47
Discharge: HOME | End: 2022-06-24
Attending: INTERNAL MEDICINE
Payer: COMMERCIAL

## 2022-06-24 DIAGNOSIS — R92.8 ABNORMAL MAMMOGRAM: ICD-10-CM

## 2022-06-24 PROCEDURE — 76642 ULTRASOUND BREAST LIMITED: CPT | Performed by: INTERNAL MEDICINE

## 2022-07-11 RX ORDER — INSULIN ASPART 100 [IU]/ML
INJECTION, SOLUTION INTRAVENOUS; SUBCUTANEOUS
Qty: 54 ML | Refills: 0 | Status: SHIPPED | OUTPATIENT
Start: 2022-07-11

## 2022-07-11 RX ORDER — DULAGLUTIDE 1.5 MG/.5ML
INJECTION, SOLUTION SUBCUTANEOUS
Qty: 2 ML | Refills: 0 | OUTPATIENT
Start: 2022-07-11

## 2022-07-11 RX ORDER — DULAGLUTIDE 1.5 MG/.5ML
INJECTION, SOLUTION SUBCUTANEOUS
Qty: 2 ML | Refills: 0 | Status: SHIPPED | OUTPATIENT
Start: 2022-07-11

## 2022-07-26 ENCOUNTER — OFFICE VISIT (OUTPATIENT)
Dept: INTERNAL MEDICINE CLINIC | Facility: CLINIC | Age: 47
End: 2022-07-26
Payer: COMMERCIAL

## 2022-07-26 ENCOUNTER — HOSPITAL ENCOUNTER (OUTPATIENT)
Dept: GENERAL RADIOLOGY | Age: 47
Discharge: HOME | End: 2022-07-26
Attending: FAMILY MEDICINE
Payer: COMMERCIAL

## 2022-07-26 VITALS
HEIGHT: 69 IN | DIASTOLIC BLOOD PRESSURE: 88 MMHG | HEART RATE: 97 BPM | RESPIRATION RATE: 18 BRPM | SYSTOLIC BLOOD PRESSURE: 128 MMHG | WEIGHT: 230.63 LBS | BODY MASS INDEX: 34.16 KG/M2

## 2022-07-26 DIAGNOSIS — G89.29 CHRONIC LEFT SHOULDER PAIN: ICD-10-CM

## 2022-07-26 DIAGNOSIS — M25.512 CHRONIC LEFT SHOULDER PAIN: ICD-10-CM

## 2022-07-26 DIAGNOSIS — M77.12 LEFT LATERAL EPICONDYLITIS: Primary | ICD-10-CM

## 2022-07-26 DIAGNOSIS — L71.9 ACNE ROSACEA: ICD-10-CM

## 2022-07-26 PROCEDURE — 3074F SYST BP LT 130 MM HG: CPT | Performed by: FAMILY MEDICINE

## 2022-07-26 PROCEDURE — 99214 OFFICE O/P EST MOD 30 MIN: CPT | Performed by: FAMILY MEDICINE

## 2022-07-26 PROCEDURE — 3079F DIAST BP 80-89 MM HG: CPT | Performed by: FAMILY MEDICINE

## 2022-07-26 PROCEDURE — 73030 X-RAY EXAM OF SHOULDER: CPT | Performed by: FAMILY MEDICINE

## 2022-07-26 PROCEDURE — 3008F BODY MASS INDEX DOCD: CPT | Performed by: FAMILY MEDICINE

## 2022-07-26 RX ORDER — DOXYCYCLINE HYCLATE 100 MG/1
100 CAPSULE ORAL 2 TIMES DAILY
Qty: 60 CAPSULE | Refills: 0 | Status: SHIPPED | OUTPATIENT
Start: 2022-07-26 | End: 2022-08-25

## 2022-08-12 RX ORDER — DULAGLUTIDE 1.5 MG/.5ML
INJECTION, SOLUTION SUBCUTANEOUS
Qty: 2 ML | Refills: 0 | Status: SHIPPED | OUTPATIENT
Start: 2022-08-12

## 2022-09-12 RX ORDER — FLASH GLUCOSE SENSOR
KIT MISCELLANEOUS
Qty: 6 EACH | Refills: 0 | Status: SHIPPED | OUTPATIENT
Start: 2022-09-12 | End: 2022-11-30

## 2022-09-12 NOTE — TELEPHONE ENCOUNTER
RX refilled per protocol. Patient is due for CPE ; please change 11/4/2022 appointment to CPE if OK with patient. Thank you.

## 2022-09-26 LAB — COLONOSCOPY STUDY: NORMAL

## 2022-10-21 ENCOUNTER — OFFICE VISIT (OUTPATIENT)
Dept: INTERNAL MEDICINE CLINIC | Facility: CLINIC | Age: 47
End: 2022-10-21
Payer: COMMERCIAL

## 2022-10-21 VITALS
BODY MASS INDEX: 31.99 KG/M2 | WEIGHT: 216 LBS | OXYGEN SATURATION: 100 % | DIASTOLIC BLOOD PRESSURE: 84 MMHG | SYSTOLIC BLOOD PRESSURE: 136 MMHG | RESPIRATION RATE: 18 BRPM | HEART RATE: 98 BPM | HEIGHT: 69 IN

## 2022-10-21 DIAGNOSIS — N89.8 VAGINAL IRRITATION: Primary | ICD-10-CM

## 2022-10-21 DIAGNOSIS — R30.0 BURNING WITH URINATION: ICD-10-CM

## 2022-10-21 LAB
APPEARANCE: CLEAR
BILIRUBIN: NEGATIVE
GLUCOSE (URINE DIPSTICK): >=1000 MG/DL
KETONES (URINE DIPSTICK): NEGATIVE MG/DL
LEUKOCYTES: NEGATIVE
MULTISTIX LOT#: ABNORMAL NUMERIC
NITRITE, URINE: NEGATIVE
OCCULT BLOOD: NEGATIVE
PH, URINE: 5 (ref 4.5–8)
PROTEIN (URINE DIPSTICK): NEGATIVE MG/DL
SPECIFIC GRAVITY: 1.01 (ref 1–1.03)
URINE-COLOR: YELLOW
UROBILINOGEN,SEMI-QN: 0.2 MG/DL (ref 0–1.9)

## 2022-10-21 PROCEDURE — 87660 TRICHOMONAS VAGIN DIR PROBE: CPT | Performed by: FAMILY MEDICINE

## 2022-10-21 PROCEDURE — 99213 OFFICE O/P EST LOW 20 MIN: CPT | Performed by: FAMILY MEDICINE

## 2022-10-21 PROCEDURE — 87510 GARDNER VAG DNA DIR PROBE: CPT | Performed by: FAMILY MEDICINE

## 2022-10-21 PROCEDURE — 81003 URINALYSIS AUTO W/O SCOPE: CPT | Performed by: FAMILY MEDICINE

## 2022-10-21 PROCEDURE — 87480 CANDIDA DNA DIR PROBE: CPT | Performed by: FAMILY MEDICINE

## 2022-10-21 PROCEDURE — 3075F SYST BP GE 130 - 139MM HG: CPT | Performed by: FAMILY MEDICINE

## 2022-10-21 PROCEDURE — 3008F BODY MASS INDEX DOCD: CPT | Performed by: FAMILY MEDICINE

## 2022-10-21 PROCEDURE — 3079F DIAST BP 80-89 MM HG: CPT | Performed by: FAMILY MEDICINE

## 2022-10-21 RX ORDER — FLUCONAZOLE 150 MG/1
150 TABLET ORAL ONCE
Qty: 1 TABLET | Refills: 0 | Status: SHIPPED | OUTPATIENT
Start: 2022-10-21 | End: 2022-10-21

## 2022-11-01 DIAGNOSIS — N89.8 VAGINAL IRRITATION: ICD-10-CM

## 2022-11-01 RX ORDER — FLUCONAZOLE 150 MG/1
150 TABLET ORAL ONCE
Qty: 1 TABLET | Refills: 0 | Status: SHIPPED | OUTPATIENT
Start: 2022-11-01 | End: 2022-11-01

## 2022-11-01 NOTE — TELEPHONE ENCOUNTER
Pt stated she saw [facility] about a week ago for vaginal irritation, she stated she is seeing relief however wants to know if she can have more of the same antibiotics to completely clear things out.

## 2022-11-01 NOTE — TELEPHONE ENCOUNTER
Patient states improvement of vaginal irritation, burning sensation still present and seeing some symptoms lingering. Asking for another dose, pended if agreeable. Patient states she will get a notification if filled so no call back required.  Asked for call if any follow up to this request.

## 2022-11-04 ENCOUNTER — OFFICE VISIT (OUTPATIENT)
Dept: INTERNAL MEDICINE CLINIC | Facility: CLINIC | Age: 47
End: 2022-11-04
Payer: COMMERCIAL

## 2022-11-04 VITALS
WEIGHT: 219 LBS | DIASTOLIC BLOOD PRESSURE: 82 MMHG | HEART RATE: 90 BPM | RESPIRATION RATE: 19 BRPM | OXYGEN SATURATION: 98 % | SYSTOLIC BLOOD PRESSURE: 124 MMHG | BODY MASS INDEX: 32.44 KG/M2 | HEIGHT: 68.75 IN | TEMPERATURE: 97 F

## 2022-11-04 DIAGNOSIS — E78.00 PURE HYPERCHOLESTEROLEMIA: ICD-10-CM

## 2022-11-04 DIAGNOSIS — Z23 NEED FOR VACCINATION: ICD-10-CM

## 2022-11-04 DIAGNOSIS — E11.65 TYPE 2 DIABETES MELLITUS WITH HYPERGLYCEMIA, WITH LONG-TERM CURRENT USE OF INSULIN (HCC): Primary | ICD-10-CM

## 2022-11-04 DIAGNOSIS — R70.0 ELEVATED SED RATE: ICD-10-CM

## 2022-11-04 DIAGNOSIS — I10 PRIMARY HYPERTENSION: ICD-10-CM

## 2022-11-04 DIAGNOSIS — Z79.4 TYPE 2 DIABETES MELLITUS WITH HYPERGLYCEMIA, WITH LONG-TERM CURRENT USE OF INSULIN (HCC): Primary | ICD-10-CM

## 2022-11-04 PROCEDURE — 3074F SYST BP LT 130 MM HG: CPT | Performed by: INTERNAL MEDICINE

## 2022-11-04 PROCEDURE — 90471 IMMUNIZATION ADMIN: CPT | Performed by: INTERNAL MEDICINE

## 2022-11-04 PROCEDURE — 99214 OFFICE O/P EST MOD 30 MIN: CPT | Performed by: INTERNAL MEDICINE

## 2022-11-04 PROCEDURE — 3079F DIAST BP 80-89 MM HG: CPT | Performed by: INTERNAL MEDICINE

## 2022-11-04 PROCEDURE — 3008F BODY MASS INDEX DOCD: CPT | Performed by: INTERNAL MEDICINE

## 2022-11-04 PROCEDURE — 90686 IIV4 VACC NO PRSV 0.5 ML IM: CPT | Performed by: INTERNAL MEDICINE

## 2022-11-04 RX ORDER — ATORVASTATIN CALCIUM 40 MG/1
40 TABLET, FILM COATED ORAL NIGHTLY
Qty: 90 TABLET | Refills: 1 | Status: SHIPPED | OUTPATIENT
Start: 2022-11-04

## 2022-11-07 ENCOUNTER — LAB ENCOUNTER (OUTPATIENT)
Dept: LAB | Age: 47
End: 2022-11-07
Attending: INTERNAL MEDICINE
Payer: COMMERCIAL

## 2022-11-07 DIAGNOSIS — R70.0 ELEVATED SED RATE: ICD-10-CM

## 2022-11-07 LAB
CRP SERPL-MCNC: 0.87 MG/DL (ref ?–0.3)
ERYTHROCYTE [SEDIMENTATION RATE] IN BLOOD: 57 MM/HR

## 2022-11-07 PROCEDURE — 85652 RBC SED RATE AUTOMATED: CPT | Performed by: INTERNAL MEDICINE

## 2022-11-07 PROCEDURE — 86140 C-REACTIVE PROTEIN: CPT | Performed by: INTERNAL MEDICINE

## 2022-11-08 ENCOUNTER — TELEPHONE (OUTPATIENT)
Dept: INTERNAL MEDICINE CLINIC | Facility: CLINIC | Age: 47
End: 2022-11-08

## 2022-11-08 NOTE — TELEPHONE ENCOUNTER
Pt called stating she saw AS on 11/4-wants to discuss the labs she had done yesterday and other s/s she is having with the nurse-she saw [address] on 11/1 for vaginal irritation/burning and was given meds but still having s/s

## 2022-11-09 ENCOUNTER — LAB ENCOUNTER (OUTPATIENT)
Dept: LAB | Age: 47
End: 2022-11-09
Attending: INTERNAL MEDICINE
Payer: COMMERCIAL

## 2022-11-09 DIAGNOSIS — R79.82 ELEVATED C-REACTIVE PROTEIN (CRP): ICD-10-CM

## 2022-11-09 DIAGNOSIS — R70.0 ELEVATED SED RATE: ICD-10-CM

## 2022-11-09 LAB — RHEUMATOID FACT SERPL-ACNC: <10 IU/ML (ref ?–15)

## 2022-11-09 PROCEDURE — 86038 ANTINUCLEAR ANTIBODIES: CPT | Performed by: INTERNAL MEDICINE

## 2022-11-09 PROCEDURE — 86225 DNA ANTIBODY NATIVE: CPT | Performed by: INTERNAL MEDICINE

## 2022-11-09 PROCEDURE — 86431 RHEUMATOID FACTOR QUANT: CPT | Performed by: INTERNAL MEDICINE

## 2022-11-09 PROCEDURE — 86200 CCP ANTIBODY: CPT | Performed by: INTERNAL MEDICINE

## 2022-11-10 NOTE — TELEPHONE ENCOUNTER
Called and spoke to pt. Pt has viewed [facility] sent regarding labs. She has already completed add'l labs AS ordered for her. Informed her we will contact her regarding results once received/AS reviews. Pt thankful. Pt saw [address] on 10/21/22 for vaginal irritation/burning with urination. UA neg for UTI. Per pt, these symptoms still persist. Did complete diflucan as rx as candida was pos on UA. This helped initially but sxs quickly returned. Scheduled for f/u for persistent sxs tomorrow with CS.      FYI to CS

## 2022-11-11 LAB
CCP IGG SERPL-ACNC: 1.2 U/ML (ref 0–6.9)
DSDNA IGG SERPL IA-ACNC: 2.3 IU/ML
ENA AB SER QL IA: 0.2 UG/L
ENA AB SER QL IA: NEGATIVE

## 2022-11-15 ENCOUNTER — HOSPITAL ENCOUNTER (OUTPATIENT)
Dept: CV DIAGNOSTICS | Facility: HOSPITAL | Age: 47
Discharge: HOME | End: 2022-11-15
Attending: INTERNAL MEDICINE
Payer: COMMERCIAL

## 2022-11-15 DIAGNOSIS — E78.00 PURE HYPERCHOLESTEROLEMIA: ICD-10-CM

## 2022-11-15 DIAGNOSIS — E11.65 TYPE 2 DIABETES MELLITUS WITH HYPERGLYCEMIA, WITH LONG-TERM CURRENT USE OF INSULIN (HCC): ICD-10-CM

## 2022-11-15 DIAGNOSIS — I10 PRIMARY HYPERTENSION: ICD-10-CM

## 2022-11-15 DIAGNOSIS — Z79.4 TYPE 2 DIABETES MELLITUS WITH HYPERGLYCEMIA, WITH LONG-TERM CURRENT USE OF INSULIN (HCC): ICD-10-CM

## 2022-11-15 PROCEDURE — 93350 STRESS TTE ONLY: CPT | Performed by: INTERNAL MEDICINE

## 2022-11-15 PROCEDURE — 93017 CV STRESS TEST TRACING ONLY: CPT | Performed by: INTERNAL MEDICINE

## 2022-11-15 PROCEDURE — 93018 CV STRESS TEST I&R ONLY: CPT | Performed by: INTERNAL MEDICINE

## 2022-11-18 LAB — CYTOLOGY CVX/VAG DOC THIN PREP: NORMAL

## 2022-11-30 RX ORDER — FLASH GLUCOSE SENSOR
KIT MISCELLANEOUS
Qty: 6 EACH | Refills: 0 | Status: SHIPPED | OUTPATIENT
Start: 2022-11-30

## 2022-12-08 ENCOUNTER — OFFICE VISIT (OUTPATIENT)
Dept: ENDOCRINOLOGY CLINIC | Facility: CLINIC | Age: 47
End: 2022-12-08
Payer: COMMERCIAL

## 2022-12-08 VITALS
HEART RATE: 115 BPM | DIASTOLIC BLOOD PRESSURE: 87 MMHG | WEIGHT: 227.81 LBS | SYSTOLIC BLOOD PRESSURE: 136 MMHG | BODY MASS INDEX: 34 KG/M2

## 2022-12-08 DIAGNOSIS — E11.65 UNCONTROLLED TYPE 2 DIABETES MELLITUS WITH HYPERGLYCEMIA (HCC): Primary | ICD-10-CM

## 2022-12-08 LAB
CARTRIDGE LOT#: ABNORMAL NUMERIC
GLUCOSE BLOOD: 336
HEMOGLOBIN A1C: 10.4 % (ref 4.3–5.6)
TEST STRIP LOT #: NORMAL NUMERIC

## 2022-12-08 PROCEDURE — 83036 HEMOGLOBIN GLYCOSYLATED A1C: CPT | Performed by: INTERNAL MEDICINE

## 2022-12-08 PROCEDURE — 99214 OFFICE O/P EST MOD 30 MIN: CPT | Performed by: INTERNAL MEDICINE

## 2022-12-08 PROCEDURE — 95251 CONT GLUC MNTR ANALYSIS I&R: CPT | Performed by: INTERNAL MEDICINE

## 2022-12-08 PROCEDURE — 3075F SYST BP GE 130 - 139MM HG: CPT | Performed by: INTERNAL MEDICINE

## 2022-12-08 PROCEDURE — 3079F DIAST BP 80-89 MM HG: CPT | Performed by: INTERNAL MEDICINE

## 2022-12-08 PROCEDURE — 82947 ASSAY GLUCOSE BLOOD QUANT: CPT | Performed by: INTERNAL MEDICINE

## 2022-12-08 PROCEDURE — 3046F HEMOGLOBIN A1C LEVEL >9.0%: CPT | Performed by: INTERNAL MEDICINE

## 2022-12-08 RX ORDER — BLOOD-GLUCOSE TRANSMITTER
1 EACH MISCELLANEOUS
Qty: 1 EACH | Refills: 3 | Status: SHIPPED | OUTPATIENT
Start: 2022-12-08

## 2022-12-08 RX ORDER — BLOOD-GLUCOSE SENSOR
1 EACH MISCELLANEOUS
Qty: 9 EACH | Refills: 3 | Status: SHIPPED | OUTPATIENT
Start: 2022-12-08

## 2022-12-20 ENCOUNTER — TELEPHONE (OUTPATIENT)
Dept: ENDOCRINOLOGY CLINIC | Facility: CLINIC | Age: 47
End: 2022-12-20

## 2022-12-20 NOTE — TELEPHONE ENCOUNTER
Jose Luis Jackson from [facility] diabetes [facility] is following up on a  Fax that was sent on Dec 14th please follow up

## 2022-12-21 NOTE — TELEPHONE ENCOUNTER
Fax has been received and placed in Dr. Morataya [facility] folder for review and signature.

## 2022-12-27 RX ORDER — INSULIN ASPART 100 [IU]/ML
INJECTION, SOLUTION INTRAVENOUS; SUBCUTANEOUS
Qty: 54 ML | Refills: 0 | Status: SHIPPED | OUTPATIENT
Start: 2022-12-27

## 2023-01-23 ENCOUNTER — LAB ENCOUNTER (OUTPATIENT)
Dept: LAB | Age: 48
End: 2023-01-23
Attending: INTERNAL MEDICINE
Payer: COMMERCIAL

## 2023-01-23 DIAGNOSIS — E78.00 PURE HYPERCHOLESTEROLEMIA: ICD-10-CM

## 2023-01-23 DIAGNOSIS — Z79.4 TYPE 2 DIABETES MELLITUS WITH HYPERGLYCEMIA, WITH LONG-TERM CURRENT USE OF INSULIN (HCC): ICD-10-CM

## 2023-01-23 DIAGNOSIS — E11.65 TYPE 2 DIABETES MELLITUS WITH HYPERGLYCEMIA, WITH LONG-TERM CURRENT USE OF INSULIN (HCC): ICD-10-CM

## 2023-01-23 LAB
ALBUMIN SERPL-MCNC: 3.4 G/DL (ref 3.4–5)
ALBUMIN/GLOB SERPL: 0.8 {RATIO} (ref 1–2)
ALP LIVER SERPL-CCNC: 83 U/L
ALT SERPL-CCNC: 27 U/L
ANION GAP SERPL CALC-SCNC: 8 MMOL/L (ref 0–18)
AST SERPL-CCNC: 17 U/L (ref 15–37)
BILIRUB SERPL-MCNC: 0.7 MG/DL (ref 0.1–2)
BUN BLD-MCNC: 9 MG/DL (ref 7–18)
CALCIUM BLD-MCNC: 9.5 MG/DL (ref 8.5–10.1)
CHLORIDE SERPL-SCNC: 102 MMOL/L (ref 98–112)
CHOLEST SERPL-MCNC: 161 MG/DL (ref ?–200)
CO2 SERPL-SCNC: 26 MMOL/L (ref 21–32)
CREAT BLD-MCNC: 0.92 MG/DL
CREAT UR-SCNC: 122 MG/DL
FASTING PATIENT LIPID ANSWER: YES
FASTING STATUS PATIENT QL REPORTED: YES
GFR SERPLBLD BASED ON 1.73 SQ M-ARVRAT: 78 ML/MIN/1.73M2 (ref 60–?)
GLOBULIN PLAS-MCNC: 4.2 G/DL (ref 2.8–4.4)
GLUCOSE BLD-MCNC: 281 MG/DL (ref 70–99)
HDLC SERPL-MCNC: 43 MG/DL (ref 40–59)
LDLC SERPL CALC-MCNC: 88 MG/DL (ref ?–100)
MICROALBUMIN UR-MCNC: 5.4 MG/DL
MICROALBUMIN/CREAT 24H UR-RTO: 44.3 UG/MG (ref ?–30)
NONHDLC SERPL-MCNC: 118 MG/DL (ref ?–130)
OSMOLALITY SERPL CALC.SUM OF ELEC: 291 MOSM/KG (ref 275–295)
POTASSIUM SERPL-SCNC: 4.2 MMOL/L (ref 3.5–5.1)
PROT SERPL-MCNC: 7.6 G/DL (ref 6.4–8.2)
SODIUM SERPL-SCNC: 136 MMOL/L (ref 136–145)
TRIGL SERPL-MCNC: 175 MG/DL (ref 30–149)
VLDLC SERPL CALC-MCNC: 28 MG/DL (ref 0–30)

## 2023-01-23 PROCEDURE — 82570 ASSAY OF URINE CREATININE: CPT | Performed by: INTERNAL MEDICINE

## 2023-01-23 PROCEDURE — 82043 UR ALBUMIN QUANTITATIVE: CPT | Performed by: INTERNAL MEDICINE

## 2023-01-23 PROCEDURE — 3060F POS MICROALBUMINURIA REV: CPT | Performed by: INTERNAL MEDICINE

## 2023-01-23 PROCEDURE — 3061F NEG MICROALBUMINURIA REV: CPT | Performed by: INTERNAL MEDICINE

## 2023-01-23 PROCEDURE — 80053 COMPREHEN METABOLIC PANEL: CPT | Performed by: INTERNAL MEDICINE

## 2023-01-23 PROCEDURE — 80061 LIPID PANEL: CPT | Performed by: INTERNAL MEDICINE

## 2023-01-25 ENCOUNTER — APPOINTMENT (OUTPATIENT)
Dept: FAMILY MEDICINE | Age: 48
End: 2023-01-25

## 2023-01-25 RX ORDER — ATORVASTATIN CALCIUM 40 MG/1
TABLET, FILM COATED ORAL
COMMUNITY
Start: 2022-12-23

## 2023-01-25 RX ORDER — DAPAGLIFLOZIN 10 MG/1
TABLET, FILM COATED ORAL
COMMUNITY
Start: 2022-12-08

## 2023-01-25 RX ORDER — INSULIN ASPART 100 [IU]/ML
INJECTION, SOLUTION INTRAVENOUS; SUBCUTANEOUS
COMMUNITY
Start: 2022-12-31

## 2023-01-25 RX ORDER — DULAGLUTIDE 1.5 MG/.5ML
INJECTION, SOLUTION SUBCUTANEOUS
COMMUNITY
Start: 2022-08-12

## 2023-01-25 RX ORDER — FLASH GLUCOSE SENSOR
KIT MISCELLANEOUS
COMMUNITY
Start: 2022-12-23

## 2023-01-25 RX ORDER — PROCHLORPERAZINE 25 MG/1
SUPPOSITORY RECTAL
COMMUNITY
Start: 2022-12-08

## 2023-01-25 RX ORDER — FLUCONAZOLE 150 MG/1
TABLET ORAL
COMMUNITY
Start: 2022-11-01

## 2023-01-25 RX ORDER — INSULIN DEGLUDEC INJECTION 100 U/ML
INJECTION, SOLUTION SUBCUTANEOUS
COMMUNITY
Start: 2022-12-23

## 2023-01-26 ENCOUNTER — OFFICE VISIT (OUTPATIENT)
Dept: FAMILY MEDICINE | Age: 48
End: 2023-01-26

## 2023-01-26 VITALS
DIASTOLIC BLOOD PRESSURE: 85 MMHG | SYSTOLIC BLOOD PRESSURE: 136 MMHG | HEIGHT: 69 IN | RESPIRATION RATE: 19 BRPM | TEMPERATURE: 96.2 F | HEART RATE: 90 BPM | OXYGEN SATURATION: 100 %

## 2023-01-26 DIAGNOSIS — M77.12 LATERAL EPICONDYLITIS OF LEFT ELBOW: ICD-10-CM

## 2023-01-26 DIAGNOSIS — E11.9 TYPE 2 DIABETES MELLITUS WITHOUT COMPLICATION, WITH LONG-TERM CURRENT USE OF INSULIN (CMD): Primary | ICD-10-CM

## 2023-01-26 DIAGNOSIS — N30.20 CHRONIC CYSTITIS: ICD-10-CM

## 2023-01-26 DIAGNOSIS — Z79.4 TYPE 2 DIABETES MELLITUS WITHOUT COMPLICATION, WITH LONG-TERM CURRENT USE OF INSULIN (CMD): Primary | ICD-10-CM

## 2023-01-26 PROBLEM — E78.5 HYPERLIPIDEMIA: Status: ACTIVE | Noted: 2017-12-06

## 2023-01-26 PROBLEM — L83 ACANTHOSIS NIGRICANS: Status: ACTIVE | Noted: 2023-01-26

## 2023-01-26 PROBLEM — N60.01 BREAST CYST, RIGHT: Status: ACTIVE | Noted: 2023-01-26

## 2023-01-26 PROBLEM — G56.03 BILATERAL CARPAL TUNNEL SYNDROME: Status: ACTIVE | Noted: 2021-04-21

## 2023-01-26 PROBLEM — E11.311 DIABETIC RETINOPATHY OF BOTH EYES WITH MACULAR EDEMA ASSOCIATED WITH TYPE 2 DIABETES MELLITUS (CMD): Status: ACTIVE | Noted: 2021-05-22

## 2023-01-26 PROBLEM — L71.9 ACNE ROSACEA: Status: ACTIVE | Noted: 2019-05-14

## 2023-01-26 PROBLEM — I10 HYPERTENSION: Status: ACTIVE | Noted: 2019-05-14

## 2023-01-26 PROBLEM — L30.9 ECZEMA: Status: ACTIVE | Noted: 2023-01-26

## 2023-01-26 PROCEDURE — 3079F DIAST BP 80-89 MM HG: CPT | Performed by: FAMILY MEDICINE

## 2023-01-26 PROCEDURE — 99204 OFFICE O/P NEW MOD 45 MIN: CPT | Performed by: FAMILY MEDICINE

## 2023-01-26 PROCEDURE — 3075F SYST BP GE 130 - 139MM HG: CPT | Performed by: FAMILY MEDICINE

## 2023-01-26 ASSESSMENT — PATIENT HEALTH QUESTIONNAIRE - PHQ9
CLINICAL INTERPRETATION OF PHQ2 SCORE: NO FURTHER SCREENING NEEDED
SUM OF ALL RESPONSES TO PHQ9 QUESTIONS 1 AND 2: 0
SUM OF ALL RESPONSES TO PHQ9 QUESTIONS 1 AND 2: 0
2. FEELING DOWN, DEPRESSED OR HOPELESS: NOT AT ALL
1. LITTLE INTEREST OR PLEASURE IN DOING THINGS: NOT AT ALL

## 2023-02-02 ENCOUNTER — TELEPHONE (OUTPATIENT)
Dept: ENDOCRINOLOGY CLINIC | Facility: CLINIC | Age: 48
End: 2023-02-02

## 2023-02-02 DIAGNOSIS — E11.65 UNCONTROLLED TYPE 2 DIABETES MELLITUS WITH HYPERGLYCEMIA (HCC): Primary | ICD-10-CM

## 2023-02-02 RX ORDER — INSULIN ASPART 100 [IU]/ML
INJECTION, SOLUTION INTRAVENOUS; SUBCUTANEOUS
Qty: 110 ML | Refills: 0 | Status: SHIPPED | OUTPATIENT
Start: 2023-02-02

## 2023-02-02 NOTE — TELEPHONE ENCOUNTER
Orders signed. Thanks.
Received email from [address] from [employer] that pt was approved and pump was shipped.     Pended EMEKA along with vials of insulin; pt notified via [employer]
Yes

## 2023-02-14 ASSESSMENT — ENCOUNTER SYMPTOMS: ABDOMINAL PAIN: 1

## 2023-03-03 ENCOUNTER — OFFICE VISIT (OUTPATIENT)
Dept: INTERNAL MEDICINE CLINIC | Facility: CLINIC | Age: 48
End: 2023-03-03
Payer: COMMERCIAL

## 2023-03-03 VITALS
OXYGEN SATURATION: 97 % | SYSTOLIC BLOOD PRESSURE: 134 MMHG | DIASTOLIC BLOOD PRESSURE: 86 MMHG | RESPIRATION RATE: 18 BRPM | HEIGHT: 68.75 IN | BODY MASS INDEX: 34.48 KG/M2 | WEIGHT: 232.81 LBS | HEART RATE: 88 BPM

## 2023-03-03 DIAGNOSIS — J06.9 URI WITH COUGH AND CONGESTION: ICD-10-CM

## 2023-03-03 DIAGNOSIS — J02.9 SORE THROAT: Primary | ICD-10-CM

## 2023-03-03 LAB
CONTROL LINE PRESENT WITH A CLEAR BACKGROUND (YES/NO): YES YES/NO
KIT LOT #: 5064 NUMERIC

## 2023-03-03 PROCEDURE — 99213 OFFICE O/P EST LOW 20 MIN: CPT | Performed by: INTERNAL MEDICINE

## 2023-03-03 PROCEDURE — 3079F DIAST BP 80-89 MM HG: CPT | Performed by: INTERNAL MEDICINE

## 2023-03-03 PROCEDURE — 3075F SYST BP GE 130 - 139MM HG: CPT | Performed by: INTERNAL MEDICINE

## 2023-03-03 PROCEDURE — 87880 STREP A ASSAY W/OPTIC: CPT | Performed by: INTERNAL MEDICINE

## 2023-03-03 PROCEDURE — 3008F BODY MASS INDEX DOCD: CPT | Performed by: INTERNAL MEDICINE

## 2023-03-03 RX ORDER — AMOXICILLIN AND CLAVULANATE POTASSIUM 875; 125 MG/1; MG/1
1 TABLET, FILM COATED ORAL 2 TIMES DAILY
Qty: 10 TABLET | Refills: 0 | Status: SHIPPED | OUTPATIENT
Start: 2023-03-03 | End: 2023-03-08

## 2023-03-03 RX ORDER — AZITHROMYCIN 250 MG/1
TABLET, FILM COATED ORAL
Qty: 6 TABLET | Refills: 0 | Status: SHIPPED | OUTPATIENT
Start: 2023-03-03 | End: 2023-03-03

## 2023-03-07 ENCOUNTER — TELEPHONE (OUTPATIENT)
Dept: ENDOCRINOLOGY CLINIC | Facility: CLINIC | Age: 48
End: 2023-03-07

## 2023-03-07 NOTE — TELEPHONE ENCOUNTER
Pt has an appointment with Dr Erica Barrett on 3/9/23. Currently she has a cough and congestion, she has tested negative for both strep and Covid. She would like to know if she can turn 3/9/23 appointment to a video appt.   Please call

## 2023-03-09 ENCOUNTER — TELEPHONE (OUTPATIENT)
Dept: ENDOCRINOLOGY CLINIC | Facility: CLINIC | Age: 48
End: 2023-03-09

## 2023-03-09 ENCOUNTER — TELEMEDICINE (OUTPATIENT)
Dept: ENDOCRINOLOGY CLINIC | Facility: CLINIC | Age: 48
End: 2023-03-09

## 2023-03-09 DIAGNOSIS — E04.1 THYROID NODULE: ICD-10-CM

## 2023-03-09 DIAGNOSIS — E78.00 PURE HYPERCHOLESTEROLEMIA: ICD-10-CM

## 2023-03-09 DIAGNOSIS — E11.65 UNCONTROLLED TYPE 2 DIABETES MELLITUS WITH HYPERGLYCEMIA (HCC): Primary | ICD-10-CM

## 2023-03-09 RX ORDER — INSULIN DEGLUDEC INJECTION 100 U/ML
50 INJECTION, SOLUTION SUBCUTANEOUS DAILY
Qty: 45 ML | Refills: 2 | Status: SHIPPED | OUTPATIENT
Start: 2023-03-09

## 2023-03-09 RX ORDER — BLOOD-GLUCOSE,RECEIVER,CONT
1 EACH MISCELLANEOUS DAILY
Qty: 1 EACH | Refills: 0 | Status: SHIPPED | OUTPATIENT
Start: 2023-03-09

## 2023-03-09 RX ORDER — INSULIN ASPART 100 [IU]/ML
60 INJECTION, SOLUTION INTRAVENOUS; SUBCUTANEOUS DAILY
Qty: 54 ML | Refills: 0 | Status: SHIPPED | OUTPATIENT
Start: 2023-03-09

## 2023-03-09 NOTE — TELEPHONE ENCOUNTER
Patient was seen today and patient states to see Dr. Faustin Half at end of April.  Patient is scheduled for in person visit 4/28/23

## 2023-03-09 NOTE — TELEPHONE ENCOUNTER
F/U switched to VV - spoke to patient to advise f/u switched to VV - patient stated she has done VV in the past and is familiar with process

## 2023-03-21 ENCOUNTER — OFFICE VISIT (OUTPATIENT)
Dept: INTERNAL MEDICINE CLINIC | Facility: CLINIC | Age: 48
End: 2023-03-21
Payer: COMMERCIAL

## 2023-03-21 VITALS
WEIGHT: 234 LBS | DIASTOLIC BLOOD PRESSURE: 80 MMHG | SYSTOLIC BLOOD PRESSURE: 120 MMHG | RESPIRATION RATE: 18 BRPM | BODY MASS INDEX: 34.66 KG/M2 | HEIGHT: 69 IN | TEMPERATURE: 98 F | HEART RATE: 88 BPM

## 2023-03-21 DIAGNOSIS — J40 BRONCHITIS: Primary | ICD-10-CM

## 2023-03-21 PROCEDURE — 3008F BODY MASS INDEX DOCD: CPT | Performed by: INTERNAL MEDICINE

## 2023-03-21 PROCEDURE — 3074F SYST BP LT 130 MM HG: CPT | Performed by: INTERNAL MEDICINE

## 2023-03-21 PROCEDURE — 3079F DIAST BP 80-89 MM HG: CPT | Performed by: INTERNAL MEDICINE

## 2023-03-21 PROCEDURE — 99213 OFFICE O/P EST LOW 20 MIN: CPT | Performed by: INTERNAL MEDICINE

## 2023-03-21 RX ORDER — PHENAZOPYRIDINE HYDROCHLORIDE 100 MG/1
100 TABLET, FILM COATED ORAL 3 TIMES DAILY PRN
COMMUNITY
Start: 2023-02-09

## 2023-03-21 RX ORDER — DAPAGLIFLOZIN 10 MG/1
TABLET, FILM COATED ORAL
COMMUNITY
Start: 2023-03-01 | End: 2023-03-21

## 2023-03-21 RX ORDER — FLUCONAZOLE 150 MG/1
TABLET ORAL
COMMUNITY
Start: 2022-11-01

## 2023-03-21 RX ORDER — AZITHROMYCIN 250 MG/1
TABLET, FILM COATED ORAL
COMMUNITY
Start: 2023-03-03

## 2023-03-21 RX ORDER — ALBUTEROL SULFATE 90 UG/1
1 AEROSOL, METERED RESPIRATORY (INHALATION) EVERY 6 HOURS PRN
Qty: 1 EACH | Refills: 0 | Status: SHIPPED | OUTPATIENT
Start: 2023-03-21

## 2023-03-21 RX ORDER — CODEINE PHOSPHATE AND GUAIFENESIN 10; 100 MG/5ML; MG/5ML
5 SOLUTION ORAL 4 TIMES DAILY PRN
Qty: 180 ML | Refills: 0 | Status: SHIPPED | OUTPATIENT
Start: 2023-03-21

## 2023-04-24 ENCOUNTER — APPOINTMENT (OUTPATIENT)
Dept: ENDOCRINOLOGY | Facility: HOSPITAL | Age: 48
End: 2023-04-24
Attending: INTERNAL MEDICINE
Payer: COMMERCIAL

## 2023-04-24 ENCOUNTER — TELEPHONE (OUTPATIENT)
Dept: ENDOCRINOLOGY CLINIC | Facility: CLINIC | Age: 48
End: 2023-04-24

## 2023-04-24 DIAGNOSIS — E11.65 UNCONTROLLED TYPE 2 DIABETES MELLITUS WITH HYPERGLYCEMIA (HCC): Primary | ICD-10-CM

## 2023-05-01 ENCOUNTER — APPOINTMENT (OUTPATIENT)
Dept: ENDOCRINOLOGY | Facility: HOSPITAL | Age: 48
End: 2023-05-01
Attending: INTERNAL MEDICINE
Payer: COMMERCIAL

## 2023-05-09 RX ORDER — PROCHLORPERAZINE 25 MG/1
1 SUPPOSITORY RECTAL
Qty: 9 EACH | Refills: 0 | Status: SHIPPED | OUTPATIENT
Start: 2023-05-09

## 2023-05-09 RX ORDER — INSULIN ASPART 100 [IU]/ML
60 INJECTION, SOLUTION INTRAVENOUS; SUBCUTANEOUS DAILY
Qty: 54 ML | Refills: 0 | Status: SHIPPED | OUTPATIENT
Start: 2023-05-09

## 2023-05-09 RX ORDER — PROCHLORPERAZINE 25 MG/1
1 SUPPOSITORY RECTAL
Qty: 1 EACH | Refills: 0 | Status: SHIPPED | OUTPATIENT
Start: 2023-05-09

## 2023-05-09 RX ORDER — PEN NEEDLE, DIABETIC 32 GX3/16"
NEEDLE, DISPOSABLE MISCELLANEOUS
Qty: 300 EACH | Refills: 0 | Status: SHIPPED | OUTPATIENT
Start: 2023-05-09

## 2023-06-09 RX ORDER — FLASH GLUCOSE SENSOR
1 KIT MISCELLANEOUS
Qty: 6 EACH | Refills: 0 | Status: SHIPPED | OUTPATIENT
Start: 2023-06-09

## 2023-08-09 RX ORDER — PEN NEEDLE, DIABETIC 32 GX3/16"
NEEDLE, DISPOSABLE MISCELLANEOUS
Qty: 300 EACH | Refills: 0 | Status: SHIPPED | OUTPATIENT
Start: 2023-08-09 | End: 2023-08-11 | Stop reason: ALTCHOICE

## 2023-08-09 RX ORDER — INSULIN ASPART 100 [IU]/ML
60 INJECTION, SOLUTION INTRAVENOUS; SUBCUTANEOUS DAILY
Qty: 54 ML | Refills: 0 | Status: SHIPPED | OUTPATIENT
Start: 2023-08-09

## 2023-08-10 ENCOUNTER — TELEPHONE (OUTPATIENT)
Dept: ENDOCRINOLOGY CLINIC | Facility: CLINIC | Age: 48
End: 2023-08-10

## 2023-08-10 NOTE — TELEPHONE ENCOUNTER
Refill request    Current Outpatient Medications   Medication Sig Dispense Refill    NOVOLOG FLEXPEN 100 UNIT/ML Subcutaneous Solution Pen-injector Inject 60 Units into the skin daily. VIA CORRECTION FACTOR 54 mL 0                                                     fluconazole 150 MG Oral Tab  (Patient not taking: Reported on 3/21/2023)      guaiFENesin-codeine (CHERATUSSIN AC) 100-10 MG/5ML Oral Solution Take 5 mL by mouth 4 (four) times daily as needed for cough. 180 mL 0    albuterol 108 (90 Base) MCG/ACT Inhalation Aero Soln Inhale 1 puff into the lungs every 6 (six) hours as needed for Wheezing. 1 each 0    Continuous Blood Gluc  (DEXCOM G7 ) Does not apply Device 1 Device daily. (Patient not taking: Reported on 3/21/2023) 1 each 0    Insulin Degludec (TRESIBA FLEXTOUCH) 100 UNIT/ML Subcutaneous Solution Pen-injector Inject 0.5 mL (50 Units total) into the skin daily. 45 mL 2    insulin aspart (NOVOLOG) 100 Units/mL Injection Solution Inject up 125 units daily via insulin pump 110 mL 0    atorvastatin 40 MG Oral Tab Take 1 tablet (40 mg total) by mouth nightly.  90 tablet 1    Continuous Blood Gluc  (FREESTYLE RYAN READER) Does not apply Device Dispense 14 day  Freestyle reader for Ryan CGM 1 each 0

## 2023-08-11 RX ORDER — PEN NEEDLE, DIABETIC 32GX 5/32"
NEEDLE, DISPOSABLE MISCELLANEOUS
Qty: 300 EACH | Refills: 0 | Status: SHIPPED | OUTPATIENT
Start: 2023-08-11

## 2023-09-05 DIAGNOSIS — E11.65 UNCONTROLLED TYPE 2 DIABETES MELLITUS WITH HYPERGLYCEMIA (HCC): ICD-10-CM

## 2023-09-05 RX ORDER — INSULIN ASPART 100 [IU]/ML
INJECTION, SOLUTION INTRAVENOUS; SUBCUTANEOUS
Qty: 110 ML | Refills: 1 | Status: SHIPPED | OUTPATIENT
Start: 2023-09-05

## 2023-09-05 NOTE — TELEPHONE ENCOUNTER
Pt requesting refills to be sent to [place]. Pt is requesting multiple refills to get her to December appointment. NOVOLOG FLEXPEN 100 UNIT/ML Subcutaneous Solution Pen-injector, Inject 60 Units into the skin daily.  VIA CORRECTION FACTOR, Disp: 54 mL, Rfl: 0

## 2023-09-22 RX ORDER — PROCHLORPERAZINE 25 MG/1
1 SUPPOSITORY RECTAL
Qty: 9 EACH | Refills: 0 | Status: SHIPPED | OUTPATIENT
Start: 2023-09-22

## 2023-09-22 RX ORDER — INSULIN ASPART 100 [IU]/ML
INJECTION, SOLUTION INTRAVENOUS; SUBCUTANEOUS
Qty: 54 ML | Refills: 0 | Status: SHIPPED | OUTPATIENT
Start: 2023-09-22

## 2023-10-23 ENCOUNTER — TELEPHONE (OUTPATIENT)
Dept: ENDOCRINOLOGY CLINIC | Facility: CLINIC | Age: 48
End: 2023-10-23

## 2023-10-23 NOTE — TELEPHONE ENCOUNTER
Dr. Tovar Erps    Please advise. Patient is procedure scheduled tomorrow    I did confirm these instructions with patient:    Maggie Zaiditz to 45-50 units subcutaneous daily   Novolog to 25-16-27 units subcutaneous TID with meals  Farxiga 10 mg daily    Per notes, patient did not yet start on insulin pump.  NOV 12/2/23

## 2023-10-23 NOTE — TELEPHONE ENCOUNTER
Ok, noted. Please decrease Tresiba to 30 units subcutaneous daily today. Hold Novolog today given lack of PO intake. Hold [place]. No insulin in AM Prior to procedure. Thanks.

## 2023-10-23 NOTE — TELEPHONE ENCOUNTER
Patient states she is scheduled for [facility] tomorrow and wanted to know if there was any other recommendations she needs to follow prior to her procedure. Please call. Thank you.

## 2023-10-24 PROBLEM — Z12.11 SPECIAL SCREENING FOR MALIGNANT NEOPLASM OF COLON: Status: ACTIVE | Noted: 2023-10-24

## 2023-10-24 PROBLEM — D12.3 BENIGN NEOPLASM OF TRANSVERSE COLON: Status: ACTIVE | Noted: 2023-10-24

## 2023-11-28 RX ORDER — PROCHLORPERAZINE 25 MG/1
SUPPOSITORY RECTAL
Qty: 9 EACH | Refills: 0 | Status: SHIPPED | OUTPATIENT
Start: 2023-11-28

## 2023-12-02 ENCOUNTER — OFFICE VISIT (OUTPATIENT)
Dept: ENDOCRINOLOGY CLINIC | Facility: CLINIC | Age: 48
End: 2023-12-02

## 2023-12-02 VITALS
WEIGHT: 231 LBS | HEART RATE: 93 BPM | SYSTOLIC BLOOD PRESSURE: 146 MMHG | HEIGHT: 69 IN | DIASTOLIC BLOOD PRESSURE: 104 MMHG | BODY MASS INDEX: 34.21 KG/M2

## 2023-12-02 DIAGNOSIS — E11.65 UNCONTROLLED TYPE 2 DIABETES MELLITUS WITH HYPERGLYCEMIA (HCC): Primary | ICD-10-CM

## 2023-12-02 LAB
CARTRIDGE LOT#: ABNORMAL NUMERIC
GLUCOSE BLOOD: 243
HEMOGLOBIN A1C: 8.9 % (ref 4.3–5.6)
TEST STRIP LOT #: NORMAL NUMERIC

## 2023-12-02 PROCEDURE — 82947 ASSAY GLUCOSE BLOOD QUANT: CPT | Performed by: INTERNAL MEDICINE

## 2023-12-02 PROCEDURE — 83036 HEMOGLOBIN GLYCOSYLATED A1C: CPT | Performed by: INTERNAL MEDICINE

## 2023-12-02 PROCEDURE — 3077F SYST BP >= 140 MM HG: CPT | Performed by: INTERNAL MEDICINE

## 2023-12-02 PROCEDURE — 3008F BODY MASS INDEX DOCD: CPT | Performed by: INTERNAL MEDICINE

## 2023-12-02 PROCEDURE — 3080F DIAST BP >= 90 MM HG: CPT | Performed by: INTERNAL MEDICINE

## 2023-12-02 PROCEDURE — 3052F HG A1C>EQUAL 8.0%<EQUAL 9.0%: CPT | Performed by: INTERNAL MEDICINE

## 2023-12-02 PROCEDURE — 99214 OFFICE O/P EST MOD 30 MIN: CPT | Performed by: INTERNAL MEDICINE

## 2023-12-02 RX ORDER — ACYCLOVIR 400 MG/1
1 TABLET ORAL
Qty: 9 EACH | Refills: 1 | Status: SHIPPED | OUTPATIENT
Start: 2023-12-02

## 2023-12-02 RX ORDER — INSULIN DEGLUDEC INJECTION 100 U/ML
50 INJECTION, SOLUTION SUBCUTANEOUS DAILY
Qty: 45 ML | Refills: 2 | Status: SHIPPED | OUTPATIENT
Start: 2023-12-02

## 2023-12-02 RX ORDER — INSULIN ASPART 100 [IU]/ML
INJECTION, SOLUTION INTRAVENOUS; SUBCUTANEOUS
Qty: 54 ML | Refills: 0 | Status: SHIPPED | OUTPATIENT
Start: 2023-12-02

## 2023-12-04 NOTE — PROGRESS NOTES
Needs f/u appt for bp check.  Ok to double book with me or have patient monitor at home and get values later this week

## 2023-12-05 ENCOUNTER — TELEPHONE (OUTPATIENT)
Dept: INTERNAL MEDICINE CLINIC | Facility: CLINIC | Age: 48
End: 2023-12-05

## 2023-12-05 NOTE — TELEPHONE ENCOUNTER
Called and spoke w/ pt. Pt stated that when she got home from endo apt she checked it and BP was around 140/90. Pt stated she would still like to come in for a follow up so that we can check her BP in office. Offered apt Friday, scheduled with AS (ok for double book) at [facility] 12/8/23. Pt verbalizes understanding and agreeable to plan.      PHAM AS - Scheduled f/u 12/8/23

## 2023-12-08 ENCOUNTER — OFFICE VISIT (OUTPATIENT)
Dept: INTERNAL MEDICINE CLINIC | Facility: CLINIC | Age: 48
End: 2023-12-08
Payer: COMMERCIAL

## 2023-12-08 ENCOUNTER — LAB ENCOUNTER (OUTPATIENT)
Dept: LAB | Age: 48
End: 2023-12-08
Attending: INTERNAL MEDICINE
Payer: COMMERCIAL

## 2023-12-08 VITALS
HEART RATE: 101 BPM | BODY MASS INDEX: 34.09 KG/M2 | TEMPERATURE: 97 F | SYSTOLIC BLOOD PRESSURE: 128 MMHG | HEIGHT: 69 IN | OXYGEN SATURATION: 98 % | DIASTOLIC BLOOD PRESSURE: 76 MMHG | RESPIRATION RATE: 18 BRPM | WEIGHT: 230.19 LBS

## 2023-12-08 DIAGNOSIS — E11.65 UNCONTROLLED TYPE 2 DIABETES MELLITUS WITH HYPERGLYCEMIA (HCC): ICD-10-CM

## 2023-12-08 DIAGNOSIS — R79.82 ELEVATED C-REACTIVE PROTEIN (CRP): ICD-10-CM

## 2023-12-08 DIAGNOSIS — Z79.4 TYPE 2 DIABETES MELLITUS WITH HYPERGLYCEMIA, WITH LONG-TERM CURRENT USE OF INSULIN (HCC): Primary | ICD-10-CM

## 2023-12-08 DIAGNOSIS — I10 PRIMARY HYPERTENSION: ICD-10-CM

## 2023-12-08 DIAGNOSIS — E78.00 PURE HYPERCHOLESTEROLEMIA: ICD-10-CM

## 2023-12-08 DIAGNOSIS — R70.0 ELEVATED SED RATE: ICD-10-CM

## 2023-12-08 DIAGNOSIS — E11.65 TYPE 2 DIABETES MELLITUS WITH HYPERGLYCEMIA, WITH LONG-TERM CURRENT USE OF INSULIN (HCC): Primary | ICD-10-CM

## 2023-12-08 DIAGNOSIS — Z12.31 ENCOUNTER FOR SCREENING MAMMOGRAM FOR MALIGNANT NEOPLASM OF BREAST: ICD-10-CM

## 2023-12-08 LAB
ALBUMIN SERPL-MCNC: 3.5 G/DL (ref 3.4–5)
ALBUMIN/GLOB SERPL: 0.9 {RATIO} (ref 1–2)
ALP LIVER SERPL-CCNC: 75 U/L
ALT SERPL-CCNC: 22 U/L
ANION GAP SERPL CALC-SCNC: 5 MMOL/L (ref 0–18)
AST SERPL-CCNC: 13 U/L (ref 15–37)
BILIRUB SERPL-MCNC: 0.4 MG/DL (ref 0.1–2)
BUN BLD-MCNC: 12 MG/DL (ref 9–23)
CALCIUM BLD-MCNC: 9 MG/DL (ref 8.5–10.1)
CHLORIDE SERPL-SCNC: 105 MMOL/L (ref 98–112)
CHOLEST SERPL-MCNC: 234 MG/DL (ref ?–200)
CO2 SERPL-SCNC: 26 MMOL/L (ref 21–32)
CREAT BLD-MCNC: 0.95 MG/DL
CREAT UR-SCNC: 198 MG/DL
CRP SERPL-MCNC: 0.71 MG/DL (ref ?–0.3)
EGFRCR SERPLBLD CKD-EPI 2021: 74 ML/MIN/1.73M2 (ref 60–?)
ERYTHROCYTE [SEDIMENTATION RATE] IN BLOOD: 28 MM/HR
FASTING PATIENT LIPID ANSWER: YES
FASTING STATUS PATIENT QL REPORTED: YES
GLOBULIN PLAS-MCNC: 3.9 G/DL (ref 2.8–4.4)
GLUCOSE BLD-MCNC: 210 MG/DL (ref 70–99)
HDLC SERPL-MCNC: 40 MG/DL (ref 40–59)
LDLC SERPL CALC-MCNC: 166 MG/DL (ref ?–100)
MICROALBUMIN UR-MCNC: 4.71 MG/DL
MICROALBUMIN/CREAT 24H UR-RTO: 23.8 UG/MG (ref ?–30)
NONHDLC SERPL-MCNC: 194 MG/DL (ref ?–130)
OSMOLALITY SERPL CALC.SUM OF ELEC: 288 MOSM/KG (ref 275–295)
POTASSIUM SERPL-SCNC: 4 MMOL/L (ref 3.5–5.1)
PROT SERPL-MCNC: 7.4 G/DL (ref 6.4–8.2)
SODIUM SERPL-SCNC: 136 MMOL/L (ref 136–145)
T4 FREE SERPL-MCNC: 0.9 NG/DL (ref 0.8–1.7)
TRIGL SERPL-MCNC: 154 MG/DL (ref 30–149)
TSI SER-ACNC: 2.28 MIU/ML (ref 0.36–3.74)
VLDLC SERPL CALC-MCNC: 30 MG/DL (ref 0–30)

## 2023-12-08 PROCEDURE — 90677 PCV20 VACCINE IM: CPT | Performed by: INTERNAL MEDICINE

## 2023-12-08 PROCEDURE — 90471 IMMUNIZATION ADMIN: CPT | Performed by: INTERNAL MEDICINE

## 2023-12-08 PROCEDURE — 3008F BODY MASS INDEX DOCD: CPT | Performed by: INTERNAL MEDICINE

## 2023-12-08 PROCEDURE — 82043 UR ALBUMIN QUANTITATIVE: CPT

## 2023-12-08 PROCEDURE — 82570 ASSAY OF URINE CREATININE: CPT

## 2023-12-08 PROCEDURE — 85652 RBC SED RATE AUTOMATED: CPT

## 2023-12-08 PROCEDURE — 86140 C-REACTIVE PROTEIN: CPT | Performed by: INTERNAL MEDICINE

## 2023-12-08 PROCEDURE — 80053 COMPREHEN METABOLIC PANEL: CPT

## 2023-12-08 PROCEDURE — 99214 OFFICE O/P EST MOD 30 MIN: CPT | Performed by: INTERNAL MEDICINE

## 2023-12-08 PROCEDURE — 3061F NEG MICROALBUMINURIA REV: CPT | Performed by: INTERNAL MEDICINE

## 2023-12-08 PROCEDURE — 84439 ASSAY OF FREE THYROXINE: CPT

## 2023-12-08 PROCEDURE — 80061 LIPID PANEL: CPT

## 2023-12-08 PROCEDURE — 84443 ASSAY THYROID STIM HORMONE: CPT

## 2023-12-08 PROCEDURE — 36415 COLL VENOUS BLD VENIPUNCTURE: CPT

## 2023-12-08 PROCEDURE — 3078F DIAST BP <80 MM HG: CPT | Performed by: INTERNAL MEDICINE

## 2023-12-08 PROCEDURE — 3074F SYST BP LT 130 MM HG: CPT | Performed by: INTERNAL MEDICINE

## 2023-12-08 RX ORDER — ATORVASTATIN CALCIUM 40 MG/1
40 TABLET, FILM COATED ORAL NIGHTLY
Qty: 90 TABLET | Refills: 1 | Status: SHIPPED | OUTPATIENT
Start: 2023-12-08

## 2024-01-15 RX ORDER — INSULIN ASPART 100 [IU]/ML
INJECTION, SOLUTION INTRAVENOUS; SUBCUTANEOUS
Qty: 54 ML | Refills: 0 | Status: SHIPPED | OUTPATIENT
Start: 2024-01-15

## 2024-01-18 ENCOUNTER — MED REC SCAN ONLY (OUTPATIENT)
Dept: INTERNAL MEDICINE CLINIC | Facility: CLINIC | Age: 49
End: 2024-01-18

## 2024-02-17 ENCOUNTER — APPOINTMENT (OUTPATIENT)
Dept: ENDOCRINOLOGY | Facility: HOSPITAL | Age: 49
End: 2024-02-17
Attending: INTERNAL MEDICINE
Payer: COMMERCIAL

## 2024-02-20 ENCOUNTER — TELEPHONE (OUTPATIENT)
Dept: INTERNAL MEDICINE CLINIC | Facility: CLINIC | Age: 49
End: 2024-02-20

## 2024-02-20 NOTE — TELEPHONE ENCOUNTER
Received ARLYN request from release point requesting records.. forms sent to [employer] and copy for scanning.

## 2024-02-22 ENCOUNTER — TELEPHONE (OUTPATIENT)
Dept: ENDOCRINOLOGY CLINIC | Facility: CLINIC | Age: 49
End: 2024-02-22

## 2024-02-22 NOTE — TELEPHONE ENCOUNTER
Pt informed message has been forwarded to provider for review. She will be notified, once a decision has been made. Pt understood and had no further questions or concerns at this time.

## 2024-02-22 NOTE — TELEPHONE ENCOUNTER
Patient calling regards sooner apt states had an apt in April that was canceled offered opening for 2/29/24 or June pt declined states needs to be seen before May 3rd [place] going [place]. Please call.

## 2024-02-23 NOTE — TELEPHONE ENCOUNTER
Spoke with pt, was able to reschedule pt. Pt understood and had no further questions or concerns at this time.

## 2024-03-14 RX ORDER — INSULIN ASPART 100 [IU]/ML
INJECTION, SOLUTION INTRAVENOUS; SUBCUTANEOUS
Qty: 54 ML | Refills: 0 | Status: SHIPPED | OUTPATIENT
Start: 2024-03-14

## 2024-03-20 RX ORDER — DAPAGLIFLOZIN 10 MG/1
10 TABLET, FILM COATED ORAL DAILY
Qty: 90 TABLET | Refills: 1 | Status: SHIPPED | OUTPATIENT
Start: 2024-03-20

## 2024-04-01 RX ORDER — INSULIN ASPART 100 [IU]/ML
INJECTION, SOLUTION INTRAVENOUS; SUBCUTANEOUS
Qty: 54 ML | Refills: 0 | Status: SHIPPED | OUTPATIENT
Start: 2024-04-01

## 2024-04-26 RX ORDER — ATORVASTATIN CALCIUM 40 MG/1
40 TABLET, FILM COATED ORAL NIGHTLY
Qty: 90 TABLET | Refills: 0 | Status: SHIPPED | OUTPATIENT
Start: 2024-04-26

## 2024-04-26 NOTE — TELEPHONE ENCOUNTER
[facility] pharmacy:  [id number] - [place], [place] - [address], [phone], [phone]   [address] [facility] [address]   Phone: [phone] Fax: [phone]       Prescription Refill Request - Patient advised can take 48-72 hours.      Name of Medication (strength, dose, qty requested:          Medication Quantity Refills Start End   atorvastatin 40 MG Oral Tab 90 tablet 1 12/8/2023 --   Sig:   Take 1 tablet (40 mg total) by mouth nightly.     Route:   Oral

## 2024-07-18 RX ORDER — PEN NEEDLE, DIABETIC 32GX 5/32"
NEEDLE, DISPOSABLE MISCELLANEOUS
Qty: 300 EACH | Refills: 1 | Status: SHIPPED | OUTPATIENT
Start: 2024-07-18

## 2024-07-18 RX ORDER — INSULIN ASPART 100 [IU]/ML
INJECTION, SOLUTION INTRAVENOUS; SUBCUTANEOUS
Qty: 54 ML | Refills: 1 | Status: SHIPPED | OUTPATIENT
Start: 2024-07-18

## 2024-07-18 NOTE — TELEPHONE ENCOUNTER
Endocrine refill protocol for rapid acting, regular, intermediate, and mixed insulin:    Protocol Criteria:FAILED A1c not at goal   Appointment with Endocrinology completed in the last 6 months or scheduled in the next 3 months     Verify appointment has been completed or scheduled in the appropriate timeline. If so can send a 90 day supply with 1 refill.   Verify A1C has been completed in the last 6 months and is below 8.5%    -May substitute prescriptions for Novolog and Humalog unless documented allergy (pens and vials) at the same dose and concentration per insurance preference and provider protocol.   -May substitute prescriptions for Novolin R and Humulin R unless documented allergy (pens and vials) at the same dose and concentration per insurance preference and provider protocol.   -May substitute prescriptions for Novolin N and Humulin N unless documented allergy (pens and vials) at the same dose and concentration per insurance preference and provider protocol.   -May substitute prescriptions for Humulin and Novolin 70/30 insulin unless documented allergy at the same dose and concentration per insurance preference and provider protocol.    Last completed office visit: 12/2/2023 Lenore Li MD   Next scheduled Follow up: 07/25/2024  Last A1C result: 8.9% done 12/2/2023.      90 day supply pending   Please approve if applicable      Endocrine Refill protocol for Glucose testing supplies       Protocol Criteria:PASSED    Appointment with Endocrinology completed in the last 12 months or scheduled in the next 6 months     Verify appointment has been completed or scheduled in the appropriate timeline. If so can send a 90 day supply with 1 refill.        Last completed office visit: 12/2/2023 Lenore Li MD   Next scheduled Follow up: 07/25/2024     90 day +1 refill pending        Spoke with pt to assist in scheduling follow up appt.

## 2024-07-25 NOTE — TELEPHONE ENCOUNTER
Marie Tillman requesting Medication Refill for:    Medication name and dose (copy and paste from medication list):   Medication Quantity Refills Start End   atorvastatin 40 MG Oral Tab 90 tablet 0 4/26/2024 --   Sig:   Take 1 tablet (40 mg total) by mouth nightly.     Route:   Oral     Order #:   [id number]         If medication is not on medication list - transfer patient to RN queue for triage    Preferred Pharmacy:   [id number] - [facility] [address] AT [facility] [address], [phone], [phone]   [facility] [facility] [address]   Phone: [phone] Fax: [phone]   Hours: Not open 24 hours       LOV: 12/8/2023   Last Refill date: 04/26/24  Next Scheduled appointment:   Future Appointments   No Future Appts.

## 2024-07-28 RX ORDER — ATORVASTATIN CALCIUM 40 MG/1
40 TABLET, FILM COATED ORAL NIGHTLY
Qty: 90 TABLET | Refills: 3 | Status: SHIPPED | OUTPATIENT
Start: 2024-07-28

## 2024-07-28 NOTE — TELEPHONE ENCOUNTER
Refill passed per [facility] protocol.    Requested Prescriptions   Pending Prescriptions Disp Refills    atorvastatin 40 MG Oral Tab 90 tablet 0     Sig: Take 1 tablet (40 mg total) by mouth nightly.       Cholesterol Medication Protocol Passed - 7/25/2024 10:56 AM        Passed - ALT < 80     Lab Results   Component Value Date    ALT 22 12/08/2023             Passed - ALT resulted within past year        Passed - Lipid panel within past 12 months     Lab Results   Component Value Date    CHOLEST 234 (H) 12/08/2023    TRIG 154 (H) 12/08/2023    HDL 40 12/08/2023     (H) 12/08/2023    VLDL 30 12/08/2023    NONHDLC 194 (H) 12/08/2023             Passed - In person appointment or virtual visit in the past 12 mos or appointment in next 3 mos     Recent Outpatient Visits              7 months ago Type 2 diabetes mellitus with hyperglycemia, with long-term current use of insulin (HCC)    [facility], [address] [place], [place] [address] Schriedel, Adam, MD    Office Visit    7 months ago Uncontrolled type 2 diabetes mellitus with hyperglycemia ([facility])    [facility] Lenore Li MD    Office Visit    1 year ago Bronchitis    [facility], [address] [place], [place] [address]Oswaldo Davidson,     Office Visit    1 year ago Uncontrolled type 2 diabetes mellitus with hyperglycemia ([facility])    [facility] Lenore Li MD    Telemedicine    1 year ago Sore throat    [address] Oswaldo Almeida, DO    Office Visit          Future Appointments         Provider Department Appt Notes    In 2 weeks Lenore Li MD [facility] dm f/u                         Future Appointments         Provider Department Appt Notes    In 2 weeks Lenore Li MD [facility] dm f/u             Recent Outpatient Visits              7 months ago Type 2 diabetes mellitus with hyperglycemia, with long-term current use of insulin (HCC)    [facility], [address] [place] [address], [place] Schriedel, Adam, MD    Office Visit    7 months ago Uncontrolled type 2 diabetes mellitus with hyperglycemia ([facility])    [facility], [facility], [place] Lenore Li MD    Office Visit    1 year ago Bronchitis    [facility], [address] [place] [address], Oswaldo Price,     Office Visit    1 year ago Uncontrolled type 2 diabetes mellitus with hyperglycemia ([facility])    [facility]    Telemedicine    1 year ago Sore throat    [facility], [address] [place] [address], Oswaldo Price,     Office Visit

## 2024-08-15 ENCOUNTER — OFFICE VISIT (OUTPATIENT)
Dept: ENDOCRINOLOGY CLINIC | Facility: CLINIC | Age: 49
End: 2024-08-15

## 2024-08-15 VITALS
DIASTOLIC BLOOD PRESSURE: 85 MMHG | HEART RATE: 90 BPM | HEIGHT: 69 IN | SYSTOLIC BLOOD PRESSURE: 132 MMHG | WEIGHT: 236 LBS | BODY MASS INDEX: 34.96 KG/M2

## 2024-08-15 DIAGNOSIS — E04.1 THYROID NODULE: ICD-10-CM

## 2024-08-15 DIAGNOSIS — E11.65 UNCONTROLLED TYPE 2 DIABETES MELLITUS WITH HYPERGLYCEMIA (HCC): Primary | ICD-10-CM

## 2024-08-15 LAB
GLUCOSE BLOOD: 175
HEMOGLOBIN A1C: 10.4 % (ref 4.3–5.6)
TEST STRIP LOT #: NORMAL NUMERIC

## 2024-08-15 PROCEDURE — 3079F DIAST BP 80-89 MM HG: CPT | Performed by: INTERNAL MEDICINE

## 2024-08-15 PROCEDURE — 99214 OFFICE O/P EST MOD 30 MIN: CPT | Performed by: INTERNAL MEDICINE

## 2024-08-15 PROCEDURE — 3008F BODY MASS INDEX DOCD: CPT | Performed by: INTERNAL MEDICINE

## 2024-08-15 PROCEDURE — 3075F SYST BP GE 130 - 139MM HG: CPT | Performed by: INTERNAL MEDICINE

## 2024-08-15 PROCEDURE — 83036 HEMOGLOBIN GLYCOSYLATED A1C: CPT | Performed by: INTERNAL MEDICINE

## 2024-08-15 PROCEDURE — 3046F HEMOGLOBIN A1C LEVEL >9.0%: CPT | Performed by: INTERNAL MEDICINE

## 2024-08-15 PROCEDURE — 82947 ASSAY GLUCOSE BLOOD QUANT: CPT | Performed by: INTERNAL MEDICINE

## 2024-08-15 RX ORDER — TIRZEPATIDE 2.5 MG/.5ML
2.5 INJECTION, SOLUTION SUBCUTANEOUS WEEKLY
Qty: 2 ML | Refills: 0 | Status: SHIPPED | OUTPATIENT
Start: 2024-08-15

## 2024-08-15 RX ORDER — ACYCLOVIR 400 MG/1
1 TABLET ORAL DAILY
Qty: 1 EACH | Refills: 0 | Status: SHIPPED | OUTPATIENT
Start: 2024-08-15

## 2024-08-15 RX ORDER — TIRZEPATIDE 5 MG/.5ML
5 INJECTION, SOLUTION SUBCUTANEOUS WEEKLY
Qty: 2 ML | Refills: 3 | Status: SHIPPED | OUTPATIENT
Start: 2024-09-12

## 2024-08-15 NOTE — PROGRESS NOTES
Name: Marie Tillman  Date: 8/15/2024    Referring Physician: No ref. provider found    HISTORY OF PRESENT ILLNESS   Marie Tillman is a 49 year old female who presents for diabetes mellitus, diagnosed approximately 7-8 years ago.  She also has history of gestational DM 11 years ago.      She has owned the pump for over 6 months but still has not set up the education.     She has been lost to follow up for 9 months with higher A1c value.     Prior HbA, C or glycohemoglobin were 11.2% 9/2020; 10.7% 12/2020; 8.8% 5/2021; 9.2% 2/2022; 9.2% 5/2022; 10.4% 12/2022; 8.9% 12/2023; 10.4% POC Today     Dietary compliance: Fair - she is snacking throughout the day and grabbing food quickly during the day   Exercise: Yes - she has been doing yoga   Polyuria/polydipsia: No  Blurred vision: No    Episodes of hypoglycemia: no  Blood Glucose:  Checking 3 times per day  Dexcom CGM G7    Medications for DM   Tresiba 50 units subcutaneous bedtime   Novolog 20-24 units subcutaneous TID with meals   Farxiga 10mg PO daily     -->she has the [employer] pump for over a year but has not yet set up training     trulicity on hold due to nausea   She was unable to tolerate 4.5mg and 3mg dose due to nausea   Metformin d/c'd due to GI upset    REVIEW OF SYSTEMS  Eyes: Diabetic retinopathy present: No            Most recent visit to eye doctor in last 12 months: Yes    CV: Cardiovascular disease present: No         Hypertension present: No         Hyperlipidemia present: Yes         Peripheral Vascular Disease present: No    : Nephropathy present: No    Neuro: Neuropathy present: No    Skin: Infection or ulceration: No    Osteoporosis: No    Thyroid disease: No      Medications:     Current Outpatient Medications:     atorvastatin 40 MG Oral Tab, Take 1 tablet (40 mg total) by mouth nightly., Disp: 90 tablet, Rfl: 3    NOVOLOG FLEXPEN 100 UNIT/ML Subcutaneous Solution Pen-injector, INJECT 60 UNITS UNDER THE SKIN DAILY, Disp: 54 mL, Rfl: 1     Insulin Pen Needle (BD PEN NEEDLE SHARRON 2ND GEN) 32G X 4 MM Does not apply Misc, Use to inject insulin daily, Disp: 300 each, Rfl: 1    dapagliflozin 10 MG Oral Tab, Take 1 tablet (10 mg total) by mouth daily., Disp: 90 tablet, Rfl: 1    Continuous Blood Gluc Sensor (DEXCOM G7 SENSOR) Does not apply Misc, 1 each Every 10 days., Disp: 9 each, Rfl: 1    Insulin Degludec (TRESIBA FLEXTOUCH) 100 UNIT/ML Subcutaneous Solution Pen-injector, Inject 0.5 mL (50 Units total) into the skin daily., Disp: 45 mL, Rfl: 2    DEXCOM G6 SENSOR Does not apply Misc, CHANGE EVERY 10 DAYS, Disp: 9 each, Rfl: 0    PEG 3350-KCl-Na Bicarb-NaCl 420 g Oral Recon Soln, Take as directed by physician, Disp: 4000 mL, Rfl: 0    insulin aspart (NOVOLOG) 100 Units/mL Injection Solution, Inject up 125 units daily via insulin pump, Disp: 110 mL, Rfl: 1    Continuous Blood Gluc Sensor (FREESTYLE RYAN 14 DAY SENSOR) Does not apply Misc, 1 each every 14 (fourteen) days., Disp: 6 each, Rfl: 0    Continuous Blood Gluc Transmit (DEXCOM G6 TRANSMITTER) Does not apply Misc, 1 each every 3 (three) months., Disp: 1 each, Rfl: 0    azithromycin 250 MG Oral Tab, , Disp: , Rfl:     guaiFENesin-codeine (CHERATUSSIN AC) 100-10 MG/5ML Oral Solution, Take 5 mL by mouth 4 (four) times daily as needed for cough., Disp: 180 mL, Rfl: 0    albuterol 108 (90 Base) MCG/ACT Inhalation Aero Soln, Inhale 1 puff into the lungs every 6 (six) hours as needed for Wheezing., Disp: 1 each, Rfl: 0    Continuous Blood Gluc  (FREESTYLE RYAN READER) Does not apply Device, Dispense 14 day  Freestyle reader for Ryan CGM, Disp: 1 each, Rfl: 0     Allergies:   No Known Allergies    Social History:   Social History     Socioeconomic History    Marital status:    Tobacco Use    Smoking status: Never    Smokeless tobacco: Never   Vaping Use    Vaping status: Never Used   Substance and Sexual Activity    Alcohol use: Yes     Comment: Cage done 5-2-19 drinks wine 2x a year     Drug use: Never    Sexual activity: Yes       Medical History:   Past Medical History:    Diabetes (HCC)    Diabetes mellitus (HCC)    Frequent urination    Hyperlipidemia    Rosacea, acne    Skin blushing/flushing    Wears glasses    Weight gain       Surgical history:   Past Surgical History:   Procedure Laterality Date    Anesth, section   and      PHYSICAL EXAM  /85   Pulse 90   Ht 5' 9\" (1.753 m)   Wt 236 lb (107 kg)   LMP 2023 (Approximate)   BMI 34.85 kg/m²     General Appearance:  alert, well developed, in no acute distress  Eyes:  normal conjunctivae, sclera., normal sclera and normal pupils  Throat/Neck: normal sound to voice.   Back: no kyphosis  Respiratory:  non-labored. no increased work of breathing.    Lymph Nodes:  No abnormal nodes noted  Skin:  normal moisture and skin texture  Hematologic:  no excessive bruising  Psychiatric:  oriented to time, self, and place  Bilateral barefoot skin diabetic exam is normal, visualized feet and the appearance is normal.  Bilateral monofilament/sensation of both feet is normal.  Pulsation pedal pulse exam of both lower legs/feet is normal as well.      ASSESSMENT/PLAN:      1. Diabetes Mellitus Type 2, Uncontrolled  -Uncontrolled; 10.4% -->significant increase  -She has been lost to follow up for 9 months and never started pump therapy   -Discussed importance of glycemic control to prevent complications of diabetes  -Discussed complications of diabetes include retinopathy, neuropathy, nephropathy and cardiovascular disease  -Discussed importance of SBGM  -Discussed importance of low CHO diet, recommend 45gm per meal or 135gm per day  -Continue Tresiba 50 units subcutaneous daily   -Continue Novolog to 20-16-22 units subcutaneous TID with meals  -She has received insulin pump but still has not started pump therapy   -Placed order for diabetes center to train on pump -->discussed again starting pump therapy  -Start Mounjaro 2.5mg  subcutaneous weekly for one month then increase to 5mg subcutaneous weekly, verbalized understanding of risks and   -Demonstrated injection during OV   -Continue Farxiga 10mg PO daily  -Continue Dexcom CGM   -Normal foot exam performed today   -Normal lipids   -Normotensive   -Recheck CMP, lipids, MAB, TSH -->discussed going for labs     2. Enlarged Thyroid  -Thyroid US demonstrated subcm nodule, stable  -Recheck Thyroid US     Patient has poor insight into his health and diabetes. This patient's poorly controlled diabetes/glucose is not related to the clinical staff not providing the resources, clinical decisions/recommendations as well as educational tools necessary to control or improve glucose levels but may be more related to the patient NOT following the prescribed mediation regimen, missed or mis timing of insulin doses, not testing, and /or poor dietary habits.   This patient has been seen numerous times and has been counseled on  importance of prescription adherence, carbohydrate counting, food label reading, importance of testing, and the overall importance of controlling their blood sugars to minimize or avoid progression of complications.     RTC 3 months after starting insulin pump     12/2/2023  Lenore Li MD

## 2024-08-16 ENCOUNTER — TELEPHONE (OUTPATIENT)
Dept: ENDOCRINOLOGY CLINIC | Facility: CLINIC | Age: 49
End: 2024-08-16

## 2024-08-16 NOTE — TELEPHONE ENCOUNTER
Current Outpatient Medications   Medication Sig Dispense Refill      1 each 0    Tirzepatide (MOUNJARO) 2.5 MG/0.5ML Subcutaneous Solution Pen-injector Inject 2.5 mg into the skin once a week. 2 mL 0             KEY:[id number]

## 2024-08-17 NOTE — TELEPHONE ENCOUNTER
Medication PA Requested:  Mounjaro 2.5 mg                                                         CoverMyMeds Used:  Key: [id number]   Quantity: 2 ml  Day Supply: 28  Sig: Inject 2.5 mg weekly  DX Code:   E11.65                                  CPT code (if applicable):   Case Number/Pending Ref#:

## 2024-08-19 NOTE — TELEPHONE ENCOUNTER
Medication PA Requested:  Mounjaro 2.5 mg                                                         CoverMyMeds Used: No  Key:   Quantity: 2 ml  Day Supply: 28  Sig: Inject 2.5 mg weekly  DX Code:   E11.65              Electronic prior authorization submitted, last office visit 8/15 and A1C 8/15  Awaiting determination

## 2024-09-10 DIAGNOSIS — E11.65 UNCONTROLLED TYPE 2 DIABETES MELLITUS WITH HYPERGLYCEMIA (HCC): ICD-10-CM

## 2024-09-10 NOTE — TELEPHONE ENCOUNTER
Patient states that she received a partial refill last month.  Since she is out of town, the pharmacy can not fulfill the remainder of prescription.  She is requesting a refill to be sent to [facility] and she will have it filled in [place].       Current Outpatient Medications:       NOVOLOG FLEXPEN 100 UNIT/ML Subcutaneous Solution Pen-injector, INJECT 60 UNITS UNDER THE SKIN DAILY (Patient taking differently: INJECT 64-65 UNITS UNDER THE SKIN DAILY), Disp: 54 mL, Rfl: 1      DEXCOM G6 SENSOR Does not apply Misc, CHANGE EVERY 10 DAYS, Disp: 9 each, Rfl: 0

## 2024-09-12 RX ORDER — INSULIN ASPART 100 [IU]/ML
INJECTION, SOLUTION INTRAVENOUS; SUBCUTANEOUS
Qty: 54 ML | Refills: 1 | Status: SHIPPED | OUTPATIENT
Start: 2024-09-12

## 2024-09-12 RX ORDER — BLOOD-GLUCOSE SENSOR
1 EACH MISCELLANEOUS
Qty: 9 EACH | Refills: 1 | Status: SHIPPED | OUTPATIENT
Start: 2024-09-12

## 2024-09-12 NOTE — TELEPHONE ENCOUNTER
Endocrine refill protocol for rapid acting, regular, intermediate, and mixed insulin:    Protocol Criteria:  FAILED Reason: Elevated A1C  Appointment with Endocrinology completed in the last 6 months or scheduled in the next 3 months     Verify appointment has been completed or scheduled in the appropriate timeline. If so can send a 90 day supply with 1 refill.   Verify A1C has been completed in the last 6 months and is below 8.5%    -May substitute prescriptions for Novolog and Humalog unless documented allergy (pens and vials) at the same dose and concentration per insurance preference and provider protocol.   -May substitute prescriptions for Novolin R and Humulin R unless documented allergy (pens and vials) at the same dose and concentration per insurance preference and provider protocol.   -May substitute prescriptions for Novolin N and Humulin N unless documented allergy (pens and vials) at the same dose and concentration per insurance preference and provider protocol.   -May substitute prescriptions for Humulin and Novolin 70/30 insulin unless documented allergy at the same dose and concentration per insurance preference and provider protocol.    Last completed office visit: 8/15/2024 Lenore Li MD   Next scheduled Follow up:   Future Appointments   Date Time Provider Department Center   11/7/2024  5:00 PM Lenore Li MD [facility]      Last A1C result: 10.4% done 8/15/2024.

## 2024-10-25 NOTE — PROGRESS NOTES
Patient presents with:  Wellness Visit: MR rm 9 annual pe and DM       HPI:  Here for cpe. Has dm2, uncontrolled, taking meds, seeing endo. Has not gone up on trulicity because she had some abd discomfort with the med at times. No n/v/d. No weight loss.  Pa Solution Pen-injector Inject 3 mg into the skin once a week.  (Patient not taking: Reported on 2/24/2021 ) 12 pen 1   • Doxycycline Hyclate 100 MG Oral Cap          Physical Exam  /88 (BP Location: Left arm, Patient Position: Sitting, Cuff Size: adult Continuous Blood Gluc Sensor (FREESTYLE RYAN 14 DAY SENSOR) Does not apply Misc 6 each 1     Sig: Apply 1 Application topically every 14 (fourteen) days. Imaging & Consults:  None    Return in about 3 months (around 5/24/2021).   There are no Patient No

## 2024-10-29 RX ORDER — INSULIN DEGLUDEC 100 U/ML
50 INJECTION, SOLUTION SUBCUTANEOUS DAILY
Qty: 45 ML | Refills: 2 | Status: SHIPPED | OUTPATIENT
Start: 2024-10-29

## 2024-10-29 NOTE — TELEPHONE ENCOUNTER
Endocrine refill protocol for basal insulins     Protocol Criteria: FAILED Reason: Elevated A1C    If all below requirements are met, send a 90-day supply with 1 refill per provider protocol.       Verify Appointment with Endocrinology completed in the last 6 months or scheduled in the next 3 months.  Verify A1C has been completed within the last 6 months and is below 8.5%     Last completed office visit:8/15/2024 Lenore Li MD   Next scheduled Follow up:   Future Appointments   Date Time Provider Department Center   11/7/2024  5:00 PM Lenore Li MD [facility]      Last A1c result: Last A1c value was 10.4% done 8/15/2024.

## 2024-12-30 ENCOUNTER — TELEPHONE (OUTPATIENT)
Dept: ENDOCRINOLOGY CLINIC | Facility: CLINIC | Age: 49
End: 2024-12-30

## 2024-12-30 RX ORDER — DAPAGLIFLOZIN 10 MG/1
10 TABLET, FILM COATED ORAL DAILY
Qty: 90 TABLET | Refills: 0 | Status: SHIPPED | OUTPATIENT
Start: 2024-12-30

## 2024-12-30 NOTE — TELEPHONE ENCOUNTER
Endocrine Refill protocol for oral and injectable diabetic medications    Protocol Criteria:  FAILED  Reason: Elevated A1C    If all below requirements are met, send a 90-day supply with 1 refill per provider protocol.    Verify appointment with Endocrinology completed in the last 6 months or scheduled in the next 3 months.  Verify A1C has been completed within the last 6 months and is below 8.5%     Last completed office visit: 8/15/2024 Lenore Li MD     Next scheduled Follow up: No future appointments. - Called patient, no answer, left detailed message to call back. [employer] message sent     Last A1c result: Last A1c value was 10.4% done 8/15/2024.

## 2025-02-05 RX ORDER — INSULIN ASPART 100 [IU]/ML
INJECTION, SOLUTION INTRAVENOUS; SUBCUTANEOUS
Qty: 54 ML | Refills: 1 | Status: SHIPPED | OUTPATIENT
Start: 2025-02-05

## 2025-02-05 NOTE — TELEPHONE ENCOUNTER
Endocrine refill protocol for rapid acting, regular, intermediate, and mixed insulin:    Protocol Criteria:  FAILED Reason: Elevated A1C    If all below requirements are met, send a 90-day supply with 1 refill per provider protocol.    Verify appointment with Endocrinology completed in the last 6 months or scheduled in the next 3 months.  Verify A1C has been completed within the last 6 months and is below 8.5%    -May substitute prescriptions for Novolog and Humalog unless documented allergy (pens and vials) at the same dose and concentration per insurance preference and provider protocol.   -May substitute prescriptions for Novolin R and Humulin R unless documented allergy (pens and vials) at the same dose and concentration per insurance preference and provider protocol.   -May substitute prescriptions for Novolin N and Humulin N unless documented allergy (pens and vials) at the same dose and concentration per insurance preference and provider protocol.   -May substitute prescriptions for Humulin and Novolin 70/30 insulin unless documented allergy at the same dose and concentration per insurance preference and provider protocol.    Last completed office visit: 8/15/2024 Lenore Li MD   Next scheduled Follow up:   Future Appointments   Date Time Provider Department Center   4/29/2025 12:15 PM Lenore Li MD [facility]      Last A1C result: 10.4% done 8/15/2024.

## 2025-02-05 NOTE — TELEPHONE ENCOUNTER
Patient is requesting a refill to be sent to [facility].   Patient has scheduled follow up appointment for 4/29/25.  Please call       Current Outpatient Medications:       NOVOLOG FLEXPEN 100 UNIT/ML Subcutaneous Solution Pen-injector, Inject 20 units with breakfast, 16 units with lunch, and 22 units with dinner., Disp: 54 mL, Rfl: 1

## 2025-02-10 RX ORDER — DAPAGLIFLOZIN 10 MG/1
10 TABLET, FILM COATED ORAL DAILY
Qty: 90 TABLET | Refills: 0 | Status: SHIPPED | OUTPATIENT
Start: 2025-02-10

## 2025-02-10 RX ORDER — INSULIN DEGLUDEC 100 U/ML
50 INJECTION, SOLUTION SUBCUTANEOUS DAILY
Qty: 45 ML | Refills: 1 | Status: SHIPPED | OUTPATIENT
Start: 2025-02-10

## 2025-02-10 NOTE — TELEPHONE ENCOUNTER
Endocrine refill protocol for basal insulins     Protocol Criteria: FAILED Reason: Elevated A1C    If all below requirements are met, send a 90-day supply with 1 refill per provider protocol.       Verify Appointment with Endocrinology completed in the last 6 months or scheduled in the next 3 months.  Verify A1C has been completed within the last 6 months and is below 8.5%     Last completed office visit:8/15/2024 Lenore Li MD   Next scheduled Follow up:   Future Appointments   Date Time Provider Department Center   4/29/2025 12:15 PM Lenore Li MD [facility]      Last A1c result: Last A1c value was 10.4% done 8/15/2024.

## 2025-02-11 ENCOUNTER — LAB ENCOUNTER (OUTPATIENT)
Dept: LAB | Age: 50
End: 2025-02-11
Attending: INTERNAL MEDICINE
Payer: COMMERCIAL

## 2025-02-11 DIAGNOSIS — E11.65 UNCONTROLLED TYPE 2 DIABETES MELLITUS WITH HYPERGLYCEMIA (HCC): ICD-10-CM

## 2025-02-11 LAB
ALBUMIN SERPL-MCNC: 4.4 G/DL (ref 3.2–4.8)
ALBUMIN/GLOB SERPL: 1.4 {RATIO} (ref 1–2)
ALP LIVER SERPL-CCNC: 87 U/L
ALT SERPL-CCNC: 15 U/L
ANION GAP SERPL CALC-SCNC: 7 MMOL/L (ref 0–18)
AST SERPL-CCNC: 18 U/L (ref ?–34)
BILIRUB SERPL-MCNC: 0.5 MG/DL (ref 0.3–1.2)
BUN BLD-MCNC: 12 MG/DL (ref 9–23)
CALCIUM BLD-MCNC: 9.4 MG/DL (ref 8.7–10.6)
CHLORIDE SERPL-SCNC: 104 MMOL/L (ref 98–112)
CHOLEST SERPL-MCNC: 140 MG/DL (ref ?–200)
CO2 SERPL-SCNC: 26 MMOL/L (ref 21–32)
CREAT BLD-MCNC: 0.81 MG/DL
CREAT UR-SCNC: 81.1 MG/DL
EGFRCR SERPLBLD CKD-EPI 2021: 89 ML/MIN/1.73M2 (ref 60–?)
FASTING PATIENT LIPID ANSWER: YES
FASTING STATUS PATIENT QL REPORTED: YES
GLOBULIN PLAS-MCNC: 3.2 G/DL (ref 2–3.5)
GLUCOSE BLD-MCNC: 123 MG/DL (ref 70–99)
HDLC SERPL-MCNC: 40 MG/DL (ref 40–59)
LDLC SERPL CALC-MCNC: 81 MG/DL (ref ?–100)
MICROALBUMIN UR-MCNC: 1 MG/DL
MICROALBUMIN/CREAT 24H UR-RTO: 12.3 UG/MG (ref ?–30)
NONHDLC SERPL-MCNC: 100 MG/DL (ref ?–130)
OSMOLALITY SERPL CALC.SUM OF ELEC: 285 MOSM/KG (ref 275–295)
POTASSIUM SERPL-SCNC: 4.2 MMOL/L (ref 3.5–5.1)
PROT SERPL-MCNC: 7.6 G/DL (ref 5.7–8.2)
SODIUM SERPL-SCNC: 137 MMOL/L (ref 136–145)
TRIGL SERPL-MCNC: 100 MG/DL (ref 30–149)
TSI SER-ACNC: 3.4 UIU/ML (ref 0.55–4.78)
VLDLC SERPL CALC-MCNC: 16 MG/DL (ref 0–30)

## 2025-02-11 PROCEDURE — 80053 COMPREHEN METABOLIC PANEL: CPT

## 2025-02-11 PROCEDURE — 82043 UR ALBUMIN QUANTITATIVE: CPT

## 2025-02-11 PROCEDURE — 80061 LIPID PANEL: CPT

## 2025-02-11 PROCEDURE — 82570 ASSAY OF URINE CREATININE: CPT

## 2025-02-11 PROCEDURE — 36415 COLL VENOUS BLD VENIPUNCTURE: CPT

## 2025-02-11 PROCEDURE — 84443 ASSAY THYROID STIM HORMONE: CPT

## 2025-02-18 ENCOUNTER — HOSPITAL ENCOUNTER (OUTPATIENT)
Dept: ULTRASOUND IMAGING | Age: 50
Discharge: HOME | End: 2025-02-18
Attending: INTERNAL MEDICINE
Payer: COMMERCIAL

## 2025-02-18 DIAGNOSIS — E04.1 THYROID NODULE: ICD-10-CM

## 2025-02-18 PROCEDURE — 76536 US EXAM OF HEAD AND NECK: CPT | Performed by: INTERNAL MEDICINE

## 2025-02-20 ENCOUNTER — TELEPHONE (OUTPATIENT)
Dept: INTERNAL MEDICINE CLINIC | Facility: CLINIC | Age: 50
End: 2025-02-20

## 2025-02-27 NOTE — TELEPHONE ENCOUNTER
Received fax from [facility] dated on 2/26/25 attached is a eye exam notes from dr.michael alicea, exam put in flowsheet & placed in provider folder for review.

## 2025-03-25 ENCOUNTER — TELEPHONE (OUTPATIENT)
Dept: INTERNAL MEDICINE CLINIC | Facility: CLINIC | Age: 50
End: 2025-03-25

## 2025-03-25 NOTE — TELEPHONE ENCOUNTER
Last visit with Schriedel in 2023.  Appears pt has been seeing a provider at [facility].  Please call pt to verify who his PCP will be.  Is changed then remove Schriedel as PCP and any [facility] providers from care team.  Thx.  If Schriedel still PCP then overdue for CPE and should schedule.

## 2025-03-27 NOTE — TELEPHONE ENCOUNTER
Left Voicemail/MyChart to schedule office visit and verify if patient if patient would like to continue care with Dr. Adam Schriedel .

## 2025-04-08 ENCOUNTER — LAB ENCOUNTER (OUTPATIENT)
Dept: LAB | Age: 50
End: 2025-04-08
Attending: FAMILY MEDICINE
Payer: COMMERCIAL

## 2025-04-08 DIAGNOSIS — M35.9 DIFFUSE DISEASE OF CONNECTIVE TISSUE (HCC): ICD-10-CM

## 2025-04-08 DIAGNOSIS — D50.9 IRON DEFICIENCY ANEMIA, UNSPECIFIED: Primary | ICD-10-CM

## 2025-04-08 LAB
BASOPHILS # BLD AUTO: 0.05 X10(3) UL (ref 0–0.2)
BASOPHILS NFR BLD AUTO: 0.6 %
CRP SERPL HS-MCNC: 2.15 MG/L (ref ?–3)
DEPRECATED HBV CORE AB SER IA-ACNC: 8 NG/ML
EOSINOPHIL # BLD AUTO: 0.22 X10(3) UL (ref 0–0.7)
EOSINOPHIL NFR BLD AUTO: 2.6 %
ERYTHROCYTE [DISTWIDTH] IN BLOOD BY AUTOMATED COUNT: 25.9 %
ERYTHROCYTE [SEDIMENTATION RATE] IN BLOOD: 49 MM/HR
HCT VFR BLD AUTO: 41.2 %
HGB BLD-MCNC: 12 G/DL
IMM GRANULOCYTES # BLD AUTO: 0.03 X10(3) UL (ref 0–1)
IMM GRANULOCYTES NFR BLD: 0.4 %
IRON SATN MFR SERPL: 7 %
IRON SERPL-MCNC: 28 UG/DL
LYMPHOCYTES # BLD AUTO: 1.46 X10(3) UL (ref 1–4)
LYMPHOCYTES NFR BLD AUTO: 17.3 %
MCH RBC QN AUTO: 23.3 PG (ref 26–34)
MCHC RBC AUTO-ENTMCNC: 29.1 G/DL (ref 31–37)
MCV RBC AUTO: 80.2 FL
MONOCYTES # BLD AUTO: 0.54 X10(3) UL (ref 0.1–1)
MONOCYTES NFR BLD AUTO: 6.4 %
NEUTROPHILS # BLD AUTO: 6.13 X10 (3) UL (ref 1.5–7.7)
NEUTROPHILS # BLD AUTO: 6.13 X10(3) UL (ref 1.5–7.7)
NEUTROPHILS NFR BLD AUTO: 72.7 %
PLATELET # BLD AUTO: 352 10(3)UL (ref 150–450)
PLATELET MORPHOLOGY: NORMAL
RBC # BLD AUTO: 5.14 X10(6)UL
RHEUMATOID FACT SERPL-ACNC: <3.5 IU/ML (ref ?–14)
TOTAL IRON BINDING CAPACITY: 375 UG/DL (ref 250–425)
TRANSFERRIN SERPL-MCNC: 285 MG/DL (ref 250–380)
WBC # BLD AUTO: 8.4 X10(3) UL (ref 4–11)

## 2025-04-08 PROCEDURE — 83550 IRON BINDING TEST: CPT

## 2025-04-08 PROCEDURE — 86141 C-REACTIVE PROTEIN HS: CPT

## 2025-04-08 PROCEDURE — 85025 COMPLETE CBC W/AUTO DIFF WBC: CPT

## 2025-04-08 PROCEDURE — 36415 COLL VENOUS BLD VENIPUNCTURE: CPT

## 2025-04-08 PROCEDURE — 83540 ASSAY OF IRON: CPT

## 2025-04-08 PROCEDURE — 85652 RBC SED RATE AUTOMATED: CPT

## 2025-04-08 PROCEDURE — 86225 DNA ANTIBODY NATIVE: CPT

## 2025-04-08 PROCEDURE — 82728 ASSAY OF FERRITIN: CPT

## 2025-04-08 PROCEDURE — 86038 ANTINUCLEAR ANTIBODIES: CPT

## 2025-04-08 PROCEDURE — 86431 RHEUMATOID FACTOR QUANT: CPT

## 2025-04-09 LAB
DSDNA IGG SERPL IA-ACNC: 2.1 IU/ML (ref ?–10)
ENA AB SER QL IA: 0.2 UG/L (ref ?–0.7)
ENA AB SER QL IA: NEGATIVE

## 2025-04-29 ENCOUNTER — OFFICE VISIT (OUTPATIENT)
Dept: ENDOCRINOLOGY CLINIC | Facility: CLINIC | Age: 50
End: 2025-04-29

## 2025-04-29 VITALS
HEART RATE: 85 BPM | WEIGHT: 228 LBS | SYSTOLIC BLOOD PRESSURE: 126 MMHG | DIASTOLIC BLOOD PRESSURE: 83 MMHG | BODY MASS INDEX: 34 KG/M2

## 2025-04-29 DIAGNOSIS — E11.65 UNCONTROLLED TYPE 2 DIABETES MELLITUS WITH HYPERGLYCEMIA (HCC): Primary | ICD-10-CM

## 2025-04-29 LAB
GLUCOSE BLOOD: 124
HEMOGLOBIN A1C: 7 % (ref 4.3–5.6)
TEST STRIP LOT #: NORMAL NUMERIC

## 2025-04-29 PROCEDURE — 99214 OFFICE O/P EST MOD 30 MIN: CPT | Performed by: INTERNAL MEDICINE

## 2025-04-29 PROCEDURE — 83036 HEMOGLOBIN GLYCOSYLATED A1C: CPT | Performed by: INTERNAL MEDICINE

## 2025-04-29 PROCEDURE — 82947 ASSAY GLUCOSE BLOOD QUANT: CPT | Performed by: INTERNAL MEDICINE

## 2025-04-29 PROCEDURE — 3074F SYST BP LT 130 MM HG: CPT | Performed by: INTERNAL MEDICINE

## 2025-04-29 PROCEDURE — 3051F HG A1C>EQUAL 7.0%<8.0%: CPT | Performed by: INTERNAL MEDICINE

## 2025-04-29 PROCEDURE — 3061F NEG MICROALBUMINURIA REV: CPT | Performed by: INTERNAL MEDICINE

## 2025-04-29 PROCEDURE — 3079F DIAST BP 80-89 MM HG: CPT | Performed by: INTERNAL MEDICINE

## 2025-04-29 RX ORDER — TIRZEPATIDE 7.5 MG/.5ML
7.5 INJECTION, SOLUTION SUBCUTANEOUS WEEKLY
Qty: 6 ML | Refills: 1 | Status: SHIPPED | OUTPATIENT
Start: 2025-04-29

## 2025-04-29 NOTE — PROGRESS NOTES
Name: Marie Tillman  Date: 4/29/2025    Referring Physician: No ref. provider found    HISTORY OF PRESENT ILLNESS   Marie Tillman is a 49 year old female who presents for diabetes mellitus, diagnosed approximately 7-8 years ago.  She also has history of gestational DM 11 years ago.      She has owned the pump for over 6 months but still has not set up the education.     She has been lost to follow up for 9 months but improved A1c since starting Mounjaro in Feb 2025.     Prior HbA, C or glycohemoglobin were 11.2% 9/2020; 10.7% 12/2020; 8.8% 5/2021; 9.2% 2/2022; 9.2% 5/2022; 10.4% 12/2022; 8.9% 12/2023; 10.4% 8/2024; 7.0% POC Today    Dietary compliance: Fair - she is snacking throughout the day and grabbing food quickly during the day   Exercise: Yes - she has been doing yoga   Polyuria/polydipsia: No  Blurred vision: No    Episodes of hypoglycemia: no  Blood Glucose:  Checking 3 times per day  Dexcom CGM G7    Medications for DM   Mounjaro 5mg subcutaneous weekly (just started in Feb)   Tresiba 20-24 units subcutaneous bedtime   Novolog 14-16 units subcutaneous TID with meals   Farxiga 10mg PO daily     -->she has the [employer] pump for over a year but has not yet set up training     trulicity on hold due to nausea   She was unable to tolerate 4.5mg and 3mg dose due to nausea   Metformin d/c'd due to GI upset    REVIEW OF SYSTEMS  Eyes: Diabetic retinopathy present: Yes, mild worsening symptoms with Mounjaro and better BG levels            Most recent visit to eye doctor in last 12 months: Yes    CV: Cardiovascular disease present: No         Hypertension present: No         Hyperlipidemia present: Yes         Peripheral Vascular Disease present: No    : Nephropathy present: No    Neuro: Neuropathy present: No    Skin: Infection or ulceration: No    Osteoporosis: No    Thyroid disease: No      Medications:     Current Outpatient Medications:     insulin degludec (TRESIBA FLEXTOUCH) 100 UNIT/ML Subcutaneous Solution  Pen-injector, Inject 50 Units into the skin daily., Disp: 45 mL, Rfl: 1    dapagliflozin (FARXIGA) 10 MG Oral Tab, Take 1 tablet (10 mg total) by mouth daily., Disp: 90 tablet, Rfl: 0    NOVOLOG FLEXPEN 100 UNIT/ML Subcutaneous Solution Pen-injector, Inject 20 units with breakfast, 16 units with lunch, and 22 units with dinner., Disp: 54 mL, Rfl: 1    Continuous Glucose Sensor (DEXCOM G7 SENSOR) Does not apply Misc, 1 each Every 10 days., Disp: 9 each, Rfl: 1    Continuous Glucose  (DEXCOM G7 ) Does not apply Device, 1 Device daily., Disp: 1 each, Rfl: 0    Tirzepatide (MOUNJARO) 5 MG/0.5ML Subcutaneous Solution Pen-injector, Inject 5 mg into the skin once a week., Disp: 2 mL, Rfl: 3    atorvastatin 40 MG Oral Tab, Take 1 tablet (40 mg total) by mouth nightly., Disp: 90 tablet, Rfl: 3    Insulin Pen Needle (BD PEN NEEDLE SHARRON 2ND GEN) 32G X 4 MM Does not apply Misc, Use to inject insulin daily, Disp: 300 each, Rfl: 1    DEXCOM G6 SENSOR Does not apply Misc, CHANGE EVERY 10 DAYS, Disp: 9 each, Rfl: 0    PEG 3350-KCl-Na Bicarb-NaCl 420 g Oral Recon Soln, Take as directed by physician, Disp: 4000 mL, Rfl: 0    Continuous Blood Gluc Sensor (FREESTYLE RYAN 14 DAY SENSOR) Does not apply Misc, 1 each every 14 (fourteen) days., Disp: 6 each, Rfl: 0    Continuous Blood Gluc Transmit (DEXCOM G6 TRANSMITTER) Does not apply Misc, 1 each every 3 (three) months., Disp: 1 each, Rfl: 0    azithromycin 250 MG Oral Tab, , Disp: , Rfl:     guaiFENesin-codeine (CHERATUSSIN AC) 100-10 MG/5ML Oral Solution, Take 5 mL by mouth 4 (four) times daily as needed for cough., Disp: 180 mL, Rfl: 0    albuterol 108 (90 Base) MCG/ACT Inhalation Aero Soln, Inhale 1 puff into the lungs every 6 (six) hours as needed for Wheezing., Disp: 1 each, Rfl: 0    Continuous Blood Gluc  (FREESTYLE RYAN READER) Does not apply Device, Dispense 14 day  Freestyle reader for Ryan CGM, Disp: 1 each, Rfl: 0    Tirzepatide (MOUNJARO) 2.5  MG/0.5ML Subcutaneous Solution Pen-injector, Inject 2.5 mg into the skin once a week., Disp: 2 mL, Rfl: 0    insulin aspart (NOVOLOG) 100 Units/mL Injection Solution, Inject up 125 units daily via insulin pump (Patient not taking: Reported on 2025), Disp: 110 mL, Rfl: 1     Allergies:   No Known Allergies    Social History:   Social History     Socioeconomic History    Marital status:    Tobacco Use    Smoking status: Never    Smokeless tobacco: Never   Vaping Use    Vaping status: Never Used   Substance and Sexual Activity    Alcohol use: Yes     Comment: Cage done 5-2-19 drinks wine 2x a year    Drug use: Never    Sexual activity: Yes       Medical History:   Past Medical History:    Diabetes (HCC)    Diabetes mellitus (HCC)    Frequent urination    Hyperlipidemia    Rosacea, acne    Skin blushing/flushing    Wears glasses    Weight gain       Surgical history:   Past Surgical History:   Procedure Laterality Date    Anesth, section   and      PHYSICAL EXAM  /83   Pulse 85   Wt 228 lb (103.4 kg)   BMI 33.67 kg/m²     General Appearance:  alert, well developed, in no acute distress  Eyes:  normal conjunctivae, sclera., normal sclera and normal pupils  Throat/Neck: normal sound to voice.   Back: no kyphosis  Respiratory:  non-labored. no increased work of breathing.    Lymph Nodes:  No abnormal nodes noted  Skin:  normal moisture and skin texture  Hematologic:  no excessive bruising  Psychiatric:  oriented to time, self, and place      ASSESSMENT/PLAN:      1. Diabetes Mellitus Type 2, controlled  -controlled; 7.0% ->improved  -Now complicate by retinopathy   -Congratulated patient on significantly improved BG levels since starting Mounjaro   -Discussed importance of glycemic control to prevent complications of diabetes  -Discussed complications of diabetes include retinopathy, neuropathy, nephropathy and cardiovascular disease  -Discussed importance of SBGM  -Discussed importance  of low CHO diet, recommend 45gm per meal or 135gm per day  -Increase Mounjaro to 7.5mg subcutaneous weekly   -Continue Tresiba 20 units subcutaneous daily   -Decrease Novolog to 8 units subcutaneous TID with meals -->will try to titrate off medication  -She does have insulin pump but never started   -Continue Farxiga 10mg PO daily  -Continue Dexcom CGM   -Normal foot exam performed 8/2024  -Normal lipids   -Normotensive   -Labs UTD per PCP     2. Enlarged Thyroid  -Thyroid US demonstrated subcm nodule, stable  -Recheck 2/2026    A total of 30 minutes was spent on obtaining history, reviewing pertinent labs, evaluating patient, providing multiple treatment options, reinforcing diet/exercise and compliance, and completing documentation.       RTC 3 months    4/29/2025  Lenore Li MD

## 2025-04-29 NOTE — PROGRESS NOTES
-----------------------------  Dexcom Clarity  -----------------------------  Rachel Tillman    YOB: 1975    Generated at: Tue, Apr 29, 2025 12:19 PM CDT    Reporting period: Mon Mar 31, 2025 - Tue Apr 29, 2025  -----------------------------  Glucose Details    Average glucose: 173 mg/dL    GMI: 7.5%    Standard deviation: 45 mg/dL    Coefficient of Variation: 25.7%  -----------------------------  Time in Range    Very High: 5%    High: 38%    In Range: 57%    Low: 0%    Very Low: <1%    Target Range   mg/dL    -----------------------------  Sensor usage    Days with data: 24/30    Time active: 87%    Avg. calibrations per day: 0.0

## 2025-04-29 NOTE — PATIENT INSTRUCTIONS
DECREASE Novolog to 8 units subcutaneous 3 times per day    INCREASE Mounjaro to 7.5mg subcutaneous weekly    CONTINUE Tresiba 20 units subcutaneous daily    CONTINUE Farxiga

## 2025-05-06 RX ORDER — INSULIN ASPART 100 [IU]/ML
INJECTION, SOLUTION INTRAVENOUS; SUBCUTANEOUS
Qty: 54 ML | Refills: 1 | Status: SHIPPED | OUTPATIENT
Start: 2025-05-06

## 2025-05-06 NOTE — TELEPHONE ENCOUNTER
Endocrine refill protocol for rapid acting, regular, intermediate, and mixed insulin:    Protocol Criteria:  PASSED Reason: N/A    If all below requirements are met, send a 90-day supply with 1 refill per provider protocol.    Verify appointment with Endocrinology completed in the last 6 months or scheduled in the next 3 months.  Verify A1C has been completed within the last 6 months and is below 8.5%    -May substitute prescriptions for Novolog and Humalog unless documented allergy (pens and vials) at the same dose and concentration per insurance preference and provider protocol.   -May substitute prescriptions for Novolin R and Humulin R unless documented allergy (pens and vials) at the same dose and concentration per insurance preference and provider protocol.   -May substitute prescriptions for Novolin N and Humulin N unless documented allergy (pens and vials) at the same dose and concentration per insurance preference and provider protocol.   -May substitute prescriptions for Humulin and Novolin 70/30 insulin unless documented allergy at the same dose and concentration per insurance preference and provider protocol.    Last completed office visit: 4/29/2025 Lenore Li MD   Next scheduled Follow up:   Future Appointments   Date Time Provider Department Center   8/5/2025  4:45 PM Lenore Li MD [facility]      Last A1C result: 7% done 4/29/2025.

## 2025-07-10 ENCOUNTER — TELEPHONE (OUTPATIENT)
Dept: ENDOCRINOLOGY CLINIC | Facility: CLINIC | Age: 50
End: 2025-07-10

## 2025-07-10 NOTE — TELEPHONE ENCOUNTER
Patient is scheduled for surgery in two weeks and was advised to stop taking mounjaro and wants to speak to Rn about this please call surgery is on July 24th and took the last mounjaro yesterday

## 2025-07-14 NOTE — TELEPHONE ENCOUNTER
Spoke to pt verified name and , informed pt of MD instructions below. Pt verbalized understanding, no further questions at this time.

## 2025-07-14 NOTE — TELEPHONE ENCOUNTER
Dr. Li --    Pt is having surgery on 7/24, last dose of mounjaro on 7/9 but pt is wanting to know insulin instructions. She will be NPO at midnight.     Current regimen:   Mounjaro 7.5 -> holding   Tresiba 20 units -> every night, occasionally takes 22 or 24 but mostly always 20   Novolog 8 units TID -> confirmed   Farxiga 10mg daily -> confirmed    -----------------------------  Dexcom Clarity  -----------------------------  Rachel Tillman    YOB: 1975    Generated at: Mon, Jul 14, 2025 8:45 AM CDT    Reporting period: Tue Jul 1, 2025 - Mon Jul 14, 2025  -----------------------------  Glucose Details    Average glucose: 167 mg/dL    GMI: 7.3%    Standard deviation: 34 mg/dL    Coefficient of Variation: 20.1%  -----------------------------  Time in Range    Very High: 2%    High: 29%    In Range: 69%    Low: 0%    Very Low: 0%    Target Range   mg/dL    -----------------------------  Sensor usage    Days with data: 12/14    Time active: 90%    Avg. calibrations per day: 0.0

## 2025-07-14 NOTE — TELEPHONE ENCOUNTER
HOLD farxiga for 4 days prior to surgery.  The night before surgery decrease Tresiba to 12 units subcutaneous at bedtime.  No insulin in AM prior to surgery. Thanks.

## 2025-07-18 RX ORDER — DOXYCYCLINE HYCLATE 20 MG
20 TABLET ORAL 2 TIMES DAILY PRN
COMMUNITY

## 2025-07-18 SDOH — SOCIAL STABILITY: SOCIAL INSECURITY: HOW OFTEN DOES ANYONE, INCLUDING FAMILY AND FRIENDS, PHYSICALLY HURT YOU?: NEVER

## 2025-07-18 SDOH — SOCIAL STABILITY: SOCIAL INSECURITY: HOW OFTEN DOES ANYONE, INCLUDING FAMILY AND FRIENDS, SCREAM OR CURSE AT YOU?: NEVER

## 2025-07-18 SDOH — SOCIAL STABILITY: SOCIAL INSECURITY: HOW OFTEN DOES ANYONE, INCLUDING FAMILY AND FRIENDS, THREATEN YOU WITH HARM?: NEVER

## 2025-07-18 SDOH — SOCIAL STABILITY: SOCIAL INSECURITY: HOW OFTEN DOES ANYONE, INCLUDING FAMILY AND FRIENDS, INSULT OR TALK DOWN TO YOU?: NEVER

## 2025-07-18 ASSESSMENT — ACTIVITIES OF DAILY LIVING (ADL)
SENSORY_SUPPORT_DEVICES: CONTACTS;EYEGLASSES
ADL_SCORE: 12
ADL_BEFORE_ADMISSION: INDEPENDENT

## 2025-07-21 ENCOUNTER — LAB SERVICES (OUTPATIENT)
Dept: LAB | Age: 50
End: 2025-07-21

## 2025-07-21 DIAGNOSIS — Z01.812 PRE-PROCEDURAL LABORATORY EXAMINATIONS: ICD-10-CM

## 2025-07-21 LAB
ABO + RH BLD: NORMAL
BLD GP AB SCN SERPL QL GEL: NEGATIVE
TYPE AND SCREEN EXPIRATION DATE: NORMAL

## 2025-07-21 PROCEDURE — 86850 RBC ANTIBODY SCREEN: CPT | Performed by: INTERNAL MEDICINE

## 2025-07-21 PROCEDURE — 86900 BLOOD TYPING SEROLOGIC ABO: CPT | Performed by: INTERNAL MEDICINE

## 2025-07-21 PROCEDURE — 86901 BLOOD TYPING SEROLOGIC RH(D): CPT | Performed by: INTERNAL MEDICINE

## 2025-07-21 PROCEDURE — 36415 COLL VENOUS BLD VENIPUNCTURE: CPT | Performed by: INTERNAL MEDICINE

## 2025-07-22 RX ORDER — MULTIVIT-MIN/IRON/FOLIC ACID/K 18-600-40
50 CAPSULE ORAL DAILY
COMMUNITY

## 2025-07-24 ENCOUNTER — HOSPITAL ENCOUNTER (OUTPATIENT)
Age: 50
Discharge: HOME | End: 2025-07-25
Attending: SPECIALIST | Admitting: SPECIALIST

## 2025-07-24 ENCOUNTER — ANESTHESIA EVENT (OUTPATIENT)
Dept: SURGERY | Age: 50
End: 2025-07-24

## 2025-07-24 ENCOUNTER — ANESTHESIA (OUTPATIENT)
Dept: SURGERY | Age: 50
End: 2025-07-24

## 2025-07-24 DIAGNOSIS — Z01.812 PRE-PROCEDURAL LABORATORY EXAMINATIONS: Primary | ICD-10-CM

## 2025-07-24 PROBLEM — D25.9 FIBROID UTERUS: Status: ACTIVE | Noted: 2025-07-24

## 2025-07-24 LAB
ALBUMIN SERPL-MCNC: 3.2 G/DL (ref 3.4–5)
ALBUMIN/GLOB SERPL: 0.8 {RATIO} (ref 1–2.4)
ALP SERPL-CCNC: 88 UNITS/L (ref 45–117)
ALT SERPL-CCNC: 24 UNITS/L
ANION GAP SERPL CALC-SCNC: 13 MMOL/L (ref 7–19)
AST SERPL-CCNC: 17 UNITS/L
B-HCG UR QL: NEGATIVE
BASOPHILS # BLD: 0 K/MCL (ref 0–0.3)
BASOPHILS NFR BLD: 1 %
BILIRUB SERPL-MCNC: 0.3 MG/DL (ref 0.2–1)
BUN SERPL-MCNC: 6 MG/DL (ref 6–20)
BUN/CREAT SERPL: 9 (ref 7–25)
CALCIUM SERPL-MCNC: 8.4 MG/DL (ref 8.4–10.2)
CHLORIDE SERPL-SCNC: 105 MMOL/L (ref 97–110)
CO2 SERPL-SCNC: 25 MMOL/L (ref 21–32)
CREAT SERPL-MCNC: 0.7 MG/DL (ref 0.51–0.95)
DEPRECATED RDW RBC: 46.9 FL (ref 39–50)
EGFRCR SERPLBLD CKD-EPI 2021: >90 ML/MIN/{1.73_M2}
EOSINOPHIL # BLD: 0.3 K/MCL (ref 0–0.5)
EOSINOPHIL NFR BLD: 4 %
ERYTHROCYTE [DISTWIDTH] IN BLOOD: 15 % (ref 11–15)
FASTING DURATION TIME PATIENT: ABNORMAL H
GLOBULIN SER-MCNC: 4 G/DL (ref 2–4)
GLUCOSE BLDC GLUCOMTR-MCNC: 148 MG/DL (ref 70–99)
GLUCOSE BLDC GLUCOMTR-MCNC: 169 MG/DL (ref 70–99)
GLUCOSE BLDC GLUCOMTR-MCNC: 180 MG/DL (ref 70–99)
GLUCOSE BLDC GLUCOMTR-MCNC: 191 MG/DL (ref 70–99)
GLUCOSE BLDC GLUCOMTR-MCNC: 192 MG/DL (ref 70–99)
GLUCOSE BLDC GLUCOMTR-MCNC: 206 MG/DL (ref 70–99)
GLUCOSE BLDC GLUCOMTR-MCNC: 221 MG/DL (ref 70–99)
GLUCOSE BLDC GLUCOMTR-MCNC: 230 MG/DL (ref 70–99)
GLUCOSE SERPL-MCNC: 199 MG/DL (ref 70–99)
HCT VFR BLD CALC: 39.5 % (ref 36–46.5)
HGB BLD-MCNC: 12.1 G/DL (ref 12–15.5)
IMM GRANULOCYTES # BLD AUTO: 0 K/MCL (ref 0–0.2)
IMM GRANULOCYTES # BLD: 0 %
INTERNAL PROCEDURAL CONTROLS ACCEPTABLE: YES
LYMPHOCYTES # BLD: 1.7 K/MCL (ref 1–4.8)
LYMPHOCYTES NFR BLD: 22 %
MCH RBC QN AUTO: 26.4 PG (ref 26–34)
MCHC RBC AUTO-ENTMCNC: 30.6 G/DL (ref 32–36.5)
MCV RBC AUTO: 86.1 FL (ref 78–100)
MONOCYTES # BLD: 0.5 K/MCL (ref 0.3–0.9)
MONOCYTES NFR BLD: 7 %
NEUTROPHILS # BLD: 5.3 K/MCL (ref 1.8–7.7)
NEUTROPHILS NFR BLD: 66 %
NRBC BLD MANUAL-RTO: 0 /100 WBC
PLATELET # BLD AUTO: 313 K/MCL (ref 140–450)
POTASSIUM SERPL-SCNC: 3.8 MMOL/L (ref 3.4–5.1)
PROT SERPL-MCNC: 7.2 G/DL (ref 6.4–8.2)
RBC # BLD: 4.59 MIL/MCL (ref 4–5.2)
SODIUM SERPL-SCNC: 139 MMOL/L (ref 135–145)
TEST LOT EXPIRATION DATE: NORMAL
TEST LOT NUMBER: NORMAL
WBC # BLD: 7.9 K/MCL (ref 4.2–11)

## 2025-07-24 PROCEDURE — 96374 THER/PROPH/DIAG INJ IV PUSH: CPT

## 2025-07-24 PROCEDURE — 85025 COMPLETE CBC W/AUTO DIFF WBC: CPT | Performed by: SPECIALIST

## 2025-07-24 PROCEDURE — 10002801 HB RX 250 W/O HCPCS: Performed by: ANESTHESIOLOGY

## 2025-07-24 PROCEDURE — 10004452 HB PACU ADDL 30 MINUTES: Performed by: SPECIALIST

## 2025-07-24 PROCEDURE — 13000098 HB GENERAL ROBOTIC CASE S/U + 1ST 15 MIN: Performed by: SPECIALIST

## 2025-07-24 PROCEDURE — 10002016 HB COUNTER INCENTIVE SPIROMETRY

## 2025-07-24 PROCEDURE — A4216 STERILE WATER/SALINE, 10 ML: HCPCS | Performed by: SPECIALIST

## 2025-07-24 PROCEDURE — 10002800 HB RX 250 W HCPCS: Performed by: ANESTHESIOLOGY

## 2025-07-24 PROCEDURE — 10002803 HB RX 637: Performed by: ANESTHESIOLOGY

## 2025-07-24 PROCEDURE — 82962 GLUCOSE BLOOD TEST: CPT

## 2025-07-24 PROCEDURE — 80053 COMPREHEN METABOLIC PANEL: CPT | Performed by: SPECIALIST

## 2025-07-24 PROCEDURE — 10004651 HB RX, NO CHARGE ITEM: Performed by: SPECIALIST

## 2025-07-24 PROCEDURE — 13000099 HB GENERAL ROBOTIC CASE EA ADD MINUTE: Performed by: SPECIALIST

## 2025-07-24 PROCEDURE — A9150 MISC/EXPER NON-PRESCRIPT DRU: HCPCS | Performed by: SPECIALIST

## 2025-07-24 PROCEDURE — 81025 URINE PREGNANCY TEST: CPT | Performed by: SPECIALIST

## 2025-07-24 PROCEDURE — 13001086 HB INCENTIVE SPIROMETER W INSTRUCT

## 2025-07-24 PROCEDURE — 96376 TX/PRO/DX INJ SAME DRUG ADON: CPT

## 2025-07-24 PROCEDURE — 10002801 HB RX 250 W/O HCPCS: Performed by: SPECIALIST

## 2025-07-24 PROCEDURE — 88307 TISSUE EXAM BY PATHOLOGIST: CPT | Performed by: SPECIALIST

## 2025-07-24 PROCEDURE — 96372 THER/PROPH/DIAG INJ SC/IM: CPT | Performed by: SPECIALIST

## 2025-07-24 PROCEDURE — 10006023 HB SUPPLY 272: Performed by: SPECIALIST

## 2025-07-24 PROCEDURE — 13000002 HB ANESTHESIA  GENERAL   S/U + 1ST 15 MIN: Performed by: SPECIALIST

## 2025-07-24 PROCEDURE — 10002800 HB RX 250 W HCPCS: Performed by: SPECIALIST

## 2025-07-24 PROCEDURE — 36415 COLL VENOUS BLD VENIPUNCTURE: CPT | Performed by: SPECIALIST

## 2025-07-24 PROCEDURE — 10002803 HB RX 637: Performed by: SPECIALIST

## 2025-07-24 PROCEDURE — 10002807 HB RX 258: Performed by: ANESTHESIOLOGY

## 2025-07-24 PROCEDURE — 96375 TX/PRO/DX INJ NEW DRUG ADDON: CPT

## 2025-07-24 PROCEDURE — 13000003 HB ANESTHESIA  GENERAL EA ADD MINUTE: Performed by: SPECIALIST

## 2025-07-24 PROCEDURE — 10002807 HB RX 258: Performed by: SPECIALIST

## 2025-07-24 PROCEDURE — 10004451 HB PACU RECOVERY 1ST 30 MINUTES: Performed by: SPECIALIST

## 2025-07-24 RX ORDER — SODIUM CHLORIDE, SODIUM LACTATE, POTASSIUM CHLORIDE, CALCIUM CHLORIDE 600; 310; 30; 20 MG/100ML; MG/100ML; MG/100ML; MG/100ML
INJECTION, SOLUTION INTRAVENOUS CONTINUOUS PRN
Status: DISCONTINUED | OUTPATIENT
Start: 2025-07-24 | End: 2025-07-24

## 2025-07-24 RX ORDER — DEXTROSE MONOHYDRATE 25 G/50ML
25 INJECTION, SOLUTION INTRAVENOUS PRN
Status: DISCONTINUED | OUTPATIENT
Start: 2025-07-24 | End: 2025-07-24 | Stop reason: HOSPADM

## 2025-07-24 RX ORDER — LIDOCAINE HYDROCHLORIDE 10 MG/ML
INJECTION, SOLUTION INFILTRATION; PERINEURAL PRN
Status: DISCONTINUED | OUTPATIENT
Start: 2025-07-24 | End: 2025-07-24

## 2025-07-24 RX ORDER — 0.9 % SODIUM CHLORIDE 0.9 %
10 VIAL (ML) INJECTION PRN
Status: DISCONTINUED | OUTPATIENT
Start: 2025-07-24 | End: 2025-07-24 | Stop reason: HOSPADM

## 2025-07-24 RX ORDER — HYDRALAZINE HYDROCHLORIDE 20 MG/ML
5 INJECTION INTRAMUSCULAR; INTRAVENOUS EVERY 10 MIN PRN
Status: DISCONTINUED | OUTPATIENT
Start: 2025-07-24 | End: 2025-07-24 | Stop reason: HOSPADM

## 2025-07-24 RX ORDER — GABAPENTIN 300 MG/1
300 CAPSULE ORAL EVERY 8 HOURS SCHEDULED
Status: DISCONTINUED | OUTPATIENT
Start: 2025-07-24 | End: 2025-07-25 | Stop reason: HOSPADM

## 2025-07-24 RX ORDER — NICOTINE POLACRILEX 4 MG
30 LOZENGE BUCCAL
Status: DISCONTINUED | OUTPATIENT
Start: 2025-07-24 | End: 2025-07-24 | Stop reason: HOSPADM

## 2025-07-24 RX ORDER — ONDANSETRON 2 MG/ML
INJECTION INTRAMUSCULAR; INTRAVENOUS PRN
Status: DISCONTINUED | OUTPATIENT
Start: 2025-07-24 | End: 2025-07-24

## 2025-07-24 RX ORDER — CHLORHEXIDINE GLUCONATE ORAL RINSE 1.2 MG/ML
15 SOLUTION DENTAL EVERY 12 HOURS SCHEDULED
Status: DISCONTINUED | OUTPATIENT
Start: 2025-07-24 | End: 2025-07-25 | Stop reason: HOSPADM

## 2025-07-24 RX ORDER — LABETALOL 200 MG/1
200 TABLET, FILM COATED ORAL ONCE
Status: COMPLETED | OUTPATIENT
Start: 2025-07-24 | End: 2025-07-24

## 2025-07-24 RX ORDER — DEXAMETHASONE SODIUM PHOSPHATE 4 MG/ML
4 INJECTION, SOLUTION INTRA-ARTICULAR; INTRALESIONAL; INTRAMUSCULAR; INTRAVENOUS; SOFT TISSUE
Status: DISCONTINUED | OUTPATIENT
Start: 2025-07-24 | End: 2025-07-24 | Stop reason: HOSPADM

## 2025-07-24 RX ORDER — DEXTROSE MONOHYDRATE 25 G/50ML
25 INJECTION, SOLUTION INTRAVENOUS PRN
Status: DISCONTINUED | OUTPATIENT
Start: 2025-07-24 | End: 2025-07-25 | Stop reason: HOSPADM

## 2025-07-24 RX ORDER — ENOXAPARIN SODIUM 100 MG/ML
40 INJECTION SUBCUTANEOUS ONCE
Status: DISCONTINUED | OUTPATIENT
Start: 2025-07-24 | End: 2025-07-24

## 2025-07-24 RX ORDER — PROPOFOL 10 MG/ML
INJECTION, EMULSION INTRAVENOUS PRN
Status: DISCONTINUED | OUTPATIENT
Start: 2025-07-24 | End: 2025-07-24

## 2025-07-24 RX ORDER — CHLORHEXIDINE GLUCONATE ORAL RINSE 1.2 MG/ML
15 SOLUTION DENTAL
Status: COMPLETED | OUTPATIENT
Start: 2025-07-24 | End: 2025-07-24

## 2025-07-24 RX ORDER — DROPERIDOL 2.5 MG/ML
0.62 INJECTION, SOLUTION INTRAMUSCULAR; INTRAVENOUS
Status: DISCONTINUED | OUTPATIENT
Start: 2025-07-24 | End: 2025-07-24 | Stop reason: HOSPADM

## 2025-07-24 RX ORDER — DIPHENHYDRAMINE HYDROCHLORIDE 50 MG/ML
25 INJECTION, SOLUTION INTRAMUSCULAR; INTRAVENOUS EVERY 4 HOURS PRN
Status: DISCONTINUED | OUTPATIENT
Start: 2025-07-24 | End: 2025-07-24 | Stop reason: HOSPADM

## 2025-07-24 RX ORDER — DEXTROSE MONOHYDRATE 50 MG/ML
INJECTION, SOLUTION INTRAVENOUS CONTINUOUS PRN
Status: DISCONTINUED | OUTPATIENT
Start: 2025-07-24 | End: 2025-07-25 | Stop reason: HOSPADM

## 2025-07-24 RX ORDER — ACETAMINOPHEN 500 MG
1000 TABLET ORAL EVERY 8 HOURS SCHEDULED
Status: DISCONTINUED | OUTPATIENT
Start: 2025-07-24 | End: 2025-07-25 | Stop reason: HOSPADM

## 2025-07-24 RX ORDER — IBUPROFEN 600 MG/1
600 TABLET, FILM COATED ORAL EVERY 6 HOURS SCHEDULED
Status: DISCONTINUED | OUTPATIENT
Start: 2025-07-25 | End: 2025-07-25 | Stop reason: HOSPADM

## 2025-07-24 RX ORDER — DIPHENHYDRAMINE HYDROCHLORIDE 50 MG/ML
12.5 INJECTION, SOLUTION INTRAMUSCULAR; INTRAVENOUS
Status: DISCONTINUED | OUTPATIENT
Start: 2025-07-24 | End: 2025-07-24 | Stop reason: HOSPADM

## 2025-07-24 RX ORDER — INSULIN GLARGINE 100 [IU]/ML
24 INJECTION, SOLUTION SUBCUTANEOUS NIGHTLY
Status: DISCONTINUED | OUTPATIENT
Start: 2025-07-24 | End: 2025-07-25 | Stop reason: HOSPADM

## 2025-07-24 RX ORDER — INSULIN LISPRO 100 [IU]/ML
INJECTION, SOLUTION INTRAVENOUS; SUBCUTANEOUS PRN
Status: DISCONTINUED | OUTPATIENT
Start: 2025-07-24 | End: 2025-07-24

## 2025-07-24 RX ORDER — ONDANSETRON 2 MG/ML
4 INJECTION INTRAMUSCULAR; INTRAVENOUS EVERY 12 HOURS PRN
Status: DISCONTINUED | OUTPATIENT
Start: 2025-07-24 | End: 2025-07-24

## 2025-07-24 RX ORDER — KETOROLAC TROMETHAMINE 15 MG/ML
15 INJECTION, SOLUTION INTRAMUSCULAR; INTRAVENOUS EVERY 6 HOURS
Status: COMPLETED | OUTPATIENT
Start: 2025-07-24 | End: 2025-07-25

## 2025-07-24 RX ORDER — IPRATROPIUM BROMIDE AND ALBUTEROL SULFATE 2.5; .5 MG/3ML; MG/3ML
3 SOLUTION RESPIRATORY (INHALATION)
Status: DISCONTINUED | OUTPATIENT
Start: 2025-07-24 | End: 2025-07-24 | Stop reason: HOSPADM

## 2025-07-24 RX ORDER — ROCURONIUM BROMIDE 10 MG/ML
INJECTION, SOLUTION INTRAVENOUS PRN
Status: DISCONTINUED | OUTPATIENT
Start: 2025-07-24 | End: 2025-07-24

## 2025-07-24 RX ORDER — ONDANSETRON 2 MG/ML
4 INJECTION INTRAMUSCULAR; INTRAVENOUS EVERY 6 HOURS PRN
Status: DISCONTINUED | OUTPATIENT
Start: 2025-07-24 | End: 2025-07-25 | Stop reason: HOSPADM

## 2025-07-24 RX ORDER — OXYCODONE HYDROCHLORIDE 5 MG/1
5 TABLET ORAL EVERY 4 HOURS PRN
Status: DISCONTINUED | OUTPATIENT
Start: 2025-07-24 | End: 2025-07-25 | Stop reason: HOSPADM

## 2025-07-24 RX ORDER — METOCLOPRAMIDE HYDROCHLORIDE 5 MG/ML
INJECTION INTRAMUSCULAR; INTRAVENOUS PRN
Status: DISCONTINUED | OUTPATIENT
Start: 2025-07-24 | End: 2025-07-24

## 2025-07-24 RX ORDER — NICOTINE POLACRILEX 4 MG
30 LOZENGE BUCCAL PRN
Status: DISCONTINUED | OUTPATIENT
Start: 2025-07-24 | End: 2025-07-25 | Stop reason: HOSPADM

## 2025-07-24 RX ORDER — SODIUM CHLORIDE, SODIUM LACTATE, POTASSIUM CHLORIDE, CALCIUM CHLORIDE 600; 310; 30; 20 MG/100ML; MG/100ML; MG/100ML; MG/100ML
INJECTION, SOLUTION INTRAVENOUS CONTINUOUS
Status: DISCONTINUED | OUTPATIENT
Start: 2025-07-24 | End: 2025-07-25 | Stop reason: HOSPADM

## 2025-07-24 RX ORDER — SODIUM CHLORIDE 9 MG/ML
INJECTION, SOLUTION INTRAVENOUS CONTINUOUS
Status: DISCONTINUED | OUTPATIENT
Start: 2025-07-24 | End: 2025-07-24 | Stop reason: HOSPADM

## 2025-07-24 RX ORDER — METOCLOPRAMIDE HYDROCHLORIDE 5 MG/ML
5 INJECTION INTRAMUSCULAR; INTRAVENOUS EVERY 6 HOURS PRN
Status: DISCONTINUED | OUTPATIENT
Start: 2025-07-24 | End: 2025-07-24 | Stop reason: HOSPADM

## 2025-07-24 RX ORDER — DEXTROSE MONOHYDRATE 25 G/50ML
12.5 INJECTION, SOLUTION INTRAVENOUS PRN
Status: DISCONTINUED | OUTPATIENT
Start: 2025-07-24 | End: 2025-07-25 | Stop reason: HOSPADM

## 2025-07-24 RX ORDER — PROCHLORPERAZINE EDISYLATE 5 MG/ML
5 INJECTION INTRAMUSCULAR; INTRAVENOUS EVERY 4 HOURS PRN
Status: DISCONTINUED | OUTPATIENT
Start: 2025-07-24 | End: 2025-07-24 | Stop reason: HOSPADM

## 2025-07-24 RX ORDER — 0.9 % SODIUM CHLORIDE 0.9 %
2 VIAL (ML) INJECTION EVERY 12 HOURS SCHEDULED
Status: DISCONTINUED | OUTPATIENT
Start: 2025-07-24 | End: 2025-07-24 | Stop reason: HOSPADM

## 2025-07-24 RX ORDER — ACETAMINOPHEN 500 MG
1000 TABLET ORAL
Status: DISCONTINUED | OUTPATIENT
Start: 2025-07-24 | End: 2025-07-24 | Stop reason: HOSPADM

## 2025-07-24 RX ORDER — SODIUM CHLORIDE, SODIUM LACTATE, POTASSIUM CHLORIDE, CALCIUM CHLORIDE 600; 310; 30; 20 MG/100ML; MG/100ML; MG/100ML; MG/100ML
INJECTION, SOLUTION INTRAVENOUS CONTINUOUS
Status: DISCONTINUED | OUTPATIENT
Start: 2025-07-24 | End: 2025-07-24 | Stop reason: HOSPADM

## 2025-07-24 RX ORDER — 0.9 % SODIUM CHLORIDE 0.9 %
10 VIAL (ML) INJECTION PRN
Status: DISCONTINUED | OUTPATIENT
Start: 2025-07-24 | End: 2025-07-25 | Stop reason: HOSPADM

## 2025-07-24 RX ORDER — DIPHENHYDRAMINE HYDROCHLORIDE 50 MG/ML
12.5 INJECTION, SOLUTION INTRAMUSCULAR; INTRAVENOUS EVERY 4 HOURS PRN
Status: DISCONTINUED | OUTPATIENT
Start: 2025-07-24 | End: 2025-07-24 | Stop reason: HOSPADM

## 2025-07-24 RX ORDER — MAGNESIUM HYDROXIDE 1200 MG/15ML
LIQUID ORAL PRN
Status: DISCONTINUED | OUTPATIENT
Start: 2025-07-24 | End: 2025-07-24 | Stop reason: HOSPADM

## 2025-07-24 RX ORDER — LIDOCAINE HYDROCHLORIDE 10 MG/ML
5-10 INJECTION, SOLUTION EPIDURAL; INFILTRATION; INTRACAUDAL; PERINEURAL PRN
Status: DISCONTINUED | OUTPATIENT
Start: 2025-07-24 | End: 2025-07-24 | Stop reason: HOSPADM

## 2025-07-24 RX ORDER — CHLORHEXIDINE GLUCONATE ORAL RINSE 1.2 MG/ML
15 SOLUTION DENTAL
Status: DISCONTINUED | OUTPATIENT
Start: 2025-07-24 | End: 2025-07-24 | Stop reason: HOSPADM

## 2025-07-24 RX ORDER — PROMETHAZINE HYDROCHLORIDE 25 MG/1
25 TABLET ORAL EVERY 6 HOURS PRN
Status: DISCONTINUED | OUTPATIENT
Start: 2025-07-24 | End: 2025-07-24 | Stop reason: HOSPADM

## 2025-07-24 RX ORDER — ENOXAPARIN SODIUM 100 MG/ML
40 INJECTION SUBCUTANEOUS ONCE
Status: DISCONTINUED | OUTPATIENT
Start: 2025-07-25 | End: 2025-07-24 | Stop reason: HOSPADM

## 2025-07-24 RX ORDER — NICOTINE POLACRILEX 4 MG
15 LOZENGE BUCCAL PRN
Status: DISCONTINUED | OUTPATIENT
Start: 2025-07-24 | End: 2025-07-25 | Stop reason: HOSPADM

## 2025-07-24 RX ORDER — ONDANSETRON 4 MG/1
4 TABLET, ORALLY DISINTEGRATING ORAL EVERY 12 HOURS PRN
Status: DISCONTINUED | OUTPATIENT
Start: 2025-07-24 | End: 2025-07-25 | Stop reason: HOSPADM

## 2025-07-24 RX ORDER — ONDANSETRON 2 MG/ML
4 INJECTION INTRAMUSCULAR; INTRAVENOUS 2 TIMES DAILY PRN
Status: DISCONTINUED | OUTPATIENT
Start: 2025-07-24 | End: 2025-07-24 | Stop reason: HOSPADM

## 2025-07-24 RX ORDER — BUPIVACAINE HYDROCHLORIDE AND EPINEPHRINE 5; 5 MG/ML; UG/ML
INJECTION, SOLUTION EPIDURAL; INTRACAUDAL; PERINEURAL PRN
Status: DISCONTINUED | OUTPATIENT
Start: 2025-07-24 | End: 2025-07-24 | Stop reason: HOSPADM

## 2025-07-24 RX ORDER — ONDANSETRON 4 MG/1
4 TABLET, ORALLY DISINTEGRATING ORAL EVERY 12 HOURS PRN
Status: DISCONTINUED | OUTPATIENT
Start: 2025-07-24 | End: 2025-07-24

## 2025-07-24 RX ORDER — ENOXAPARIN SODIUM 100 MG/ML
40 INJECTION SUBCUTANEOUS DAILY
Status: DISCONTINUED | OUTPATIENT
Start: 2025-07-25 | End: 2025-07-25 | Stop reason: HOSPADM

## 2025-07-24 RX ORDER — 0.9 % SODIUM CHLORIDE 0.9 %
2 VIAL (ML) INJECTION EVERY 12 HOURS SCHEDULED
Status: DISCONTINUED | OUTPATIENT
Start: 2025-07-24 | End: 2025-07-25 | Stop reason: HOSPADM

## 2025-07-24 RX ORDER — SCOPOLAMINE 1 MG/3D
1 PATCH, EXTENDED RELEASE TRANSDERMAL
Status: DISCONTINUED | OUTPATIENT
Start: 2025-07-24 | End: 2025-07-24 | Stop reason: HOSPADM

## 2025-07-24 RX ORDER — MIDAZOLAM HYDROCHLORIDE 1 MG/ML
2 INJECTION, SOLUTION INTRAMUSCULAR; INTRAVENOUS
Status: COMPLETED | OUTPATIENT
Start: 2025-07-24 | End: 2025-07-24

## 2025-07-24 RX ADMIN — FENTANYL CITRATE 50 MCG: 50 INJECTION INTRAMUSCULAR; INTRAVENOUS at 13:31

## 2025-07-24 RX ADMIN — PROPOFOL 75 MCG/KG/MIN: 10 INJECTION, EMULSION INTRAVENOUS at 13:11

## 2025-07-24 RX ADMIN — LABETALOL HYDROCHLORIDE 200 MG: 200 TABLET, FILM COATED ORAL at 22:49

## 2025-07-24 RX ADMIN — ONDANSETRON 4 MG: 2 INJECTION INTRAMUSCULAR; INTRAVENOUS at 15:13

## 2025-07-24 RX ADMIN — HYDROMORPHONE HYDROCHLORIDE 0.4 MG: 1 INJECTION, SOLUTION INTRAMUSCULAR; INTRAVENOUS; SUBCUTANEOUS at 16:40

## 2025-07-24 RX ADMIN — FENTANYL CITRATE 25 MCG: 50 INJECTION INTRAMUSCULAR; INTRAVENOUS at 17:21

## 2025-07-24 RX ADMIN — FENTANYL CITRATE 100 MCG: 50 INJECTION INTRAMUSCULAR; INTRAVENOUS at 12:57

## 2025-07-24 RX ADMIN — HYDRALAZINE HYDROCHLORIDE 10 MG: 20 INJECTION, SOLUTION INTRAMUSCULAR; INTRAVENOUS at 13:34

## 2025-07-24 RX ADMIN — SODIUM CHLORIDE, POTASSIUM CHLORIDE, SODIUM LACTATE AND CALCIUM CHLORIDE: 600; 310; 30; 20 INJECTION, SOLUTION INTRAVENOUS at 16:05

## 2025-07-24 RX ADMIN — SODIUM CHLORIDE, POTASSIUM CHLORIDE, SODIUM LACTATE AND CALCIUM CHLORIDE: 600; 310; 30; 20 INJECTION, SOLUTION INTRAVENOUS at 18:42

## 2025-07-24 RX ADMIN — LABETALOL HYDROCHLORIDE 10 MG: 5 INJECTION, SOLUTION INTRAVENOUS at 13:00

## 2025-07-24 RX ADMIN — PROPOFOL 50 MG: 10 INJECTION, EMULSION INTRAVENOUS at 13:00

## 2025-07-24 RX ADMIN — MIDAZOLAM HYDROCHLORIDE 2 MG: 1 INJECTION, SOLUTION INTRAMUSCULAR; INTRAVENOUS at 12:48

## 2025-07-24 RX ADMIN — SODIUM CHLORIDE, PRESERVATIVE FREE 2 ML: 5 INJECTION INTRAVENOUS at 21:16

## 2025-07-24 RX ADMIN — LABETALOL HYDROCHLORIDE 40 MG: 5 INJECTION, SOLUTION INTRAVENOUS at 21:18

## 2025-07-24 RX ADMIN — INSULIN GLARGINE 24 UNITS: 100 INJECTION, SOLUTION SUBCUTANEOUS at 23:08

## 2025-07-24 RX ADMIN — SODIUM CHLORIDE, POTASSIUM CHLORIDE, SODIUM LACTATE AND CALCIUM CHLORIDE: 600; 310; 30; 20 INJECTION, SOLUTION INTRAVENOUS at 12:52

## 2025-07-24 RX ADMIN — LABETALOL HYDROCHLORIDE 20 MG: 5 INJECTION, SOLUTION INTRAVENOUS at 18:59

## 2025-07-24 RX ADMIN — METRONIDAZOLE 500 MG: 500 INJECTION, SOLUTION INTRAVENOUS at 13:14

## 2025-07-24 RX ADMIN — HYDROMORPHONE HYDROCHLORIDE 0.4 MG: 1 INJECTION, SOLUTION INTRAMUSCULAR; INTRAVENOUS; SUBCUTANEOUS at 16:22

## 2025-07-24 RX ADMIN — INSULIN LISPRO 1 UNITS: 100 INJECTION, SOLUTION INTRAVENOUS; SUBCUTANEOUS at 18:43

## 2025-07-24 RX ADMIN — INSULIN LISPRO 6 UNITS: 100 INJECTION, SOLUTION INTRAVENOUS; SUBCUTANEOUS at 11:31

## 2025-07-24 RX ADMIN — PROPOFOL 200 MG: 10 INJECTION, EMULSION INTRAVENOUS at 12:57

## 2025-07-24 RX ADMIN — FENTANYL CITRATE 50 MCG: 50 INJECTION INTRAMUSCULAR; INTRAVENOUS at 15:37

## 2025-07-24 RX ADMIN — ONDANSETRON 4 MG: 2 INJECTION INTRAMUSCULAR; INTRAVENOUS at 22:50

## 2025-07-24 RX ADMIN — KETOROLAC TROMETHAMINE 15 MG: 15 INJECTION, SOLUTION INTRAMUSCULAR; INTRAVENOUS at 17:05

## 2025-07-24 RX ADMIN — WATER 2000 MG: 1 INJECTION INTRAMUSCULAR; INTRAVENOUS; SUBCUTANEOUS at 13:05

## 2025-07-24 RX ADMIN — ROCURONIUM BROMIDE 20 MG: 10 INJECTION INTRAVENOUS at 15:31

## 2025-07-24 RX ADMIN — METOCLOPRAMIDE HYDROCHLORIDE 10 MG: 5 INJECTION INTRAMUSCULAR; INTRAVENOUS at 13:32

## 2025-07-24 RX ADMIN — ROCURONIUM BROMIDE 20 MG: 10 INJECTION INTRAVENOUS at 13:51

## 2025-07-24 RX ADMIN — ROCURONIUM BROMIDE 50 MG: 10 INJECTION INTRAVENOUS at 12:58

## 2025-07-24 RX ADMIN — CHLORHEXIDINE GLUCONATE 15 ML: 1.2 RINSE ORAL at 21:15

## 2025-07-24 RX ADMIN — SUGAMMADEX 200 MG: 100 INJECTION, SOLUTION INTRAVENOUS at 15:59

## 2025-07-24 RX ADMIN — INSULIN LISPRO 4 UNITS: 100 INJECTION, SOLUTION INTRAVENOUS; SUBCUTANEOUS at 16:42

## 2025-07-24 RX ADMIN — INSULIN LISPRO 4 UNITS: 100 INJECTION, SOLUTION INTRAVENOUS; SUBCUTANEOUS at 14:45

## 2025-07-24 RX ADMIN — ACETAMINOPHEN 1000 MG: 500 TABLET ORAL at 11:37

## 2025-07-24 RX ADMIN — CHLORHEXIDINE GLUCONATE 15 ML: 1.2 RINSE ORAL at 11:37

## 2025-07-24 RX ADMIN — OXYCODONE HYDROCHLORIDE 5 MG: 5 TABLET ORAL at 17:02

## 2025-07-24 RX ADMIN — FENTANYL CITRATE 50 MCG: 50 INJECTION INTRAMUSCULAR; INTRAVENOUS at 14:21

## 2025-07-24 RX ADMIN — ACETAMINOPHEN 1000 MG: 500 TABLET ORAL at 21:14

## 2025-07-24 RX ADMIN — GABAPENTIN 300 MG: 300 CAPSULE ORAL at 18:20

## 2025-07-24 RX ADMIN — LIDOCAINE HYDROCHLORIDE 50 MG: 10 INJECTION, SOLUTION INFILTRATION; PERINEURAL at 12:57

## 2025-07-24 SDOH — ECONOMIC STABILITY: HOUSING INSECURITY: WHAT IS YOUR LIVING SITUATION TODAY?: SPOUSE

## 2025-07-24 SDOH — ECONOMIC STABILITY: FOOD INSECURITY: WITHIN THE PAST 12 MONTHS, THE FOOD YOU BOUGHT JUST DIDN'T LAST AND YOU DIDN'T HAVE MONEY TO GET MORE.: NEVER TRUE

## 2025-07-24 SDOH — ECONOMIC STABILITY: TRANSPORTATION INSECURITY
IN THE PAST 12 MONTHS, HAS LACK OF RELIABLE TRANSPORTATION KEPT YOU FROM MEDICAL APPOINTMENTS, MEETINGS, WORK OR FROM GETTING THINGS NEEDED FOR DAILY LIVING?: NO

## 2025-07-24 SDOH — ECONOMIC STABILITY: HOUSING INSECURITY: DO YOU HAVE PROBLEMS WITH ANY OF THE FOLLOWING?: NONE OF THE ABOVE

## 2025-07-24 SDOH — HEALTH STABILITY: GENERAL
BECAUSE OF A PHYSICAL, MENTAL, OR EMOTIONAL CONDITION, DO YOU HAVE SERIOUS DIFFICULTY CONCENTRATING, REMEMBERING OR MAKING DECISIONS?: NO

## 2025-07-24 SDOH — SOCIAL STABILITY: SOCIAL INSECURITY: HOW OFTEN DOES ANYONE, INCLUDING FAMILY AND FRIENDS, THREATEN YOU WITH HARM?: NEVER

## 2025-07-24 SDOH — SOCIAL STABILITY: SOCIAL INSECURITY: HOW OFTEN DOES ANYONE, INCLUDING FAMILY AND FRIENDS, PHYSICALLY HURT YOU?: NEVER

## 2025-07-24 SDOH — HEALTH STABILITY: GENERAL: BECAUSE OF A PHYSICAL, MENTAL, OR EMOTIONAL CONDITION, DO YOU HAVE DIFFICULTY DOING ERRANDS ALONE?: NO

## 2025-07-24 SDOH — ECONOMIC STABILITY: HOUSING INSECURITY: WHAT IS YOUR LIVING SITUATION TODAY?: I HAVE A STEADY PLACE TO LIVE

## 2025-07-24 SDOH — HEALTH STABILITY: PHYSICAL HEALTH: DO YOU HAVE SERIOUS DIFFICULTY WALKING OR CLIMBING STAIRS?: NO

## 2025-07-24 SDOH — ECONOMIC STABILITY: HOUSING INSECURITY: WHAT IS YOUR LIVING SITUATION TODAY?: HOUSE

## 2025-07-24 SDOH — ECONOMIC STABILITY: INCOME INSECURITY: IN THE PAST 12 MONTHS, HAS THE ELECTRIC, GAS, OIL, OR WATER COMPANY THREATENED TO SHUT OFF SERVICE IN YOUR HOME?: NO

## 2025-07-24 SDOH — HEALTH STABILITY: PHYSICAL HEALTH: DO YOU HAVE DIFFICULTY DRESSING OR BATHING?: NO

## 2025-07-24 SDOH — SOCIAL STABILITY: SOCIAL NETWORK
HOW OFTEN DO YOU SEE OR TALK TO PEOPLE THAT YOU CARE ABOUT AND FEEL CLOSE TO? (FOR EXAMPLE: TALKING TO FRIENDS ON THE PHONE, VISITING FRIENDS OR FAMILY, GOING TO CHURCH OR CLUB MEETINGS): 5 OR MORE TIMES A WEEK

## 2025-07-24 SDOH — SOCIAL STABILITY: SOCIAL INSECURITY: HOW OFTEN DOES ANYONE, INCLUDING FAMILY AND FRIENDS, SCREAM OR CURSE AT YOU?: NEVER

## 2025-07-24 SDOH — SOCIAL STABILITY: SOCIAL NETWORK: SUPPORT SYSTEMS: SPOUSE

## 2025-07-24 SDOH — ECONOMIC STABILITY: GENERAL

## 2025-07-24 ASSESSMENT — COLUMBIA-SUICIDE SEVERITY RATING SCALE - C-SSRS
IS THE PATIENT ABLE TO COMPLETE C-SSRS: YES
6. HAVE YOU EVER DONE ANYTHING, STARTED TO DO ANYTHING, OR PREPARED TO DO ANYTHING TO END YOUR LIFE?: NO
2. HAVE YOU ACTUALLY HAD ANY THOUGHTS OF KILLING YOURSELF?: NO
1. WITHIN THE PAST MONTH, HAVE YOU WISHED YOU WERE DEAD OR WISHED YOU COULD GO TO SLEEP AND NOT WAKE UP?: NO

## 2025-07-24 ASSESSMENT — PATIENT HEALTH QUESTIONNAIRE - PHQ9
SUM OF ALL RESPONSES TO PHQ9 QUESTIONS 1 AND 2: 0
SUM OF ALL RESPONSES TO PHQ9 QUESTIONS 1 AND 2: 0
2. FEELING DOWN, DEPRESSED OR HOPELESS: NOT AT ALL
1. LITTLE INTEREST OR PLEASURE IN DOING THINGS: NOT AT ALL
IS PATIENT ABLE TO COMPLETE PHQ2 OR PHQ9: YES
CLINICAL INTERPRETATION OF PHQ2 SCORE: NO FURTHER SCREENING NEEDED

## 2025-07-24 ASSESSMENT — PAIN SCALES - GENERAL
PAINLEVEL_OUTOF10: 7
PAINLEVEL_OUTOF10: 5
PAINLEVEL_OUTOF10: 7
PAINLEVEL_OUTOF10: 6
PAINLEVEL_OUTOF10: 0
PAINLEVEL_OUTOF10: 3
PAINLEVEL_OUTOF10: 6
PAINLEVEL_OUTOF10: 5
PAINLEVEL_OUTOF10: 7

## 2025-07-24 ASSESSMENT — ACTIVITIES OF DAILY LIVING (ADL)
RECENT_DECLINE_ADL: NO
ADL_SHORT_OF_BREATH: NO
ADL_BEFORE_ADMISSION: INDEPENDENT
ADL_SCORE: 12

## 2025-07-24 ASSESSMENT — LIFESTYLE VARIABLES
AUDIT-C TOTAL SCORE: 0
HOW MANY STANDARD DRINKS CONTAINING ALCOHOL DO YOU HAVE ON A TYPICAL DAY: 0,1 OR 2
HOW OFTEN DO YOU HAVE A DRINK CONTAINING ALCOHOL: NEVER
HOW OFTEN DO YOU HAVE 6 OR MORE DRINKS ON ONE OCCASION: NEVER

## 2025-07-25 VITALS
RESPIRATION RATE: 16 BRPM | BODY MASS INDEX: 33.5 KG/M2 | SYSTOLIC BLOOD PRESSURE: 105 MMHG | OXYGEN SATURATION: 96 % | DIASTOLIC BLOOD PRESSURE: 70 MMHG | WEIGHT: 226.19 LBS | HEART RATE: 94 BPM | TEMPERATURE: 98.2 F | HEIGHT: 69 IN

## 2025-07-25 PROBLEM — D25.9 FIBROID UTERUS: Status: RESOLVED | Noted: 2025-07-24 | Resolved: 2025-07-25

## 2025-07-25 LAB
BASOPHILS # BLD: 0 K/MCL (ref 0–0.3)
BASOPHILS NFR BLD: 0 %
DEPRECATED RDW RBC: 45.6 FL (ref 39–50)
EOSINOPHIL # BLD: 0 K/MCL (ref 0–0.5)
EOSINOPHIL NFR BLD: 0 %
ERYTHROCYTE [DISTWIDTH] IN BLOOD: 14.8 % (ref 11–15)
GLUCOSE BLDC GLUCOMTR-MCNC: 180 MG/DL (ref 70–99)
HCT VFR BLD CALC: 36.7 % (ref 36–46.5)
HGB BLD-MCNC: 11.6 G/DL (ref 12–15.5)
IMM GRANULOCYTES # BLD AUTO: 0 K/MCL (ref 0–0.2)
IMM GRANULOCYTES # BLD: 0 %
LYMPHOCYTES # BLD: 1.5 K/MCL (ref 1–4.8)
LYMPHOCYTES NFR BLD: 12 %
MCH RBC QN AUTO: 26.9 PG (ref 26–34)
MCHC RBC AUTO-ENTMCNC: 31.6 G/DL (ref 32–36.5)
MCV RBC AUTO: 85.2 FL (ref 78–100)
MONOCYTES # BLD: 0.8 K/MCL (ref 0.3–0.9)
MONOCYTES NFR BLD: 6 %
NEUTROPHILS # BLD: 9.8 K/MCL (ref 1.8–7.7)
NEUTROPHILS NFR BLD: 82 %
NRBC BLD MANUAL-RTO: 0 /100 WBC
PLATELET # BLD AUTO: 310 K/MCL (ref 140–450)
RAINBOW EXTRA TUBES HOLD SPECIMEN: NORMAL
RBC # BLD: 4.31 MIL/MCL (ref 4–5.2)
WBC # BLD: 12.1 K/MCL (ref 4.2–11)

## 2025-07-25 PROCEDURE — 36415 COLL VENOUS BLD VENIPUNCTURE: CPT | Performed by: SPECIALIST

## 2025-07-25 PROCEDURE — A9150 MISC/EXPER NON-PRESCRIPT DRU: HCPCS | Performed by: SPECIALIST

## 2025-07-25 PROCEDURE — 10002800 HB RX 250 W HCPCS: Performed by: SPECIALIST

## 2025-07-25 PROCEDURE — 85025 COMPLETE CBC W/AUTO DIFF WBC: CPT | Performed by: SPECIALIST

## 2025-07-25 PROCEDURE — 10002803 HB RX 637: Performed by: SPECIALIST

## 2025-07-25 PROCEDURE — 10004651 HB RX, NO CHARGE ITEM: Performed by: SPECIALIST

## 2025-07-25 PROCEDURE — 10004180 HB COUNTER-TRANSPORT

## 2025-07-25 PROCEDURE — 82962 GLUCOSE BLOOD TEST: CPT

## 2025-07-25 PROCEDURE — 96372 THER/PROPH/DIAG INJ SC/IM: CPT | Performed by: SPECIALIST

## 2025-07-25 PROCEDURE — 96376 TX/PRO/DX INJ SAME DRUG ADON: CPT

## 2025-07-25 PROCEDURE — 10002016 HB COUNTER INCENTIVE SPIROMETRY

## 2025-07-25 PROCEDURE — A4216 STERILE WATER/SALINE, 10 ML: HCPCS | Performed by: SPECIALIST

## 2025-07-25 PROCEDURE — 96375 TX/PRO/DX INJ NEW DRUG ADDON: CPT

## 2025-07-25 RX ORDER — CELECOXIB 200 MG/1
200 CAPSULE ORAL 2 TIMES DAILY
Qty: 28 CAPSULE | Refills: 0 | Status: SHIPPED | OUTPATIENT
Start: 2025-07-25 | End: 2025-08-08

## 2025-07-25 RX ORDER — ACETAMINOPHEN 500 MG
1000 TABLET ORAL EVERY 6 HOURS PRN
Status: SHIPPED | COMMUNITY
Start: 2025-07-25

## 2025-07-25 RX ORDER — GABAPENTIN 300 MG/1
300 CAPSULE ORAL EVERY 8 HOURS SCHEDULED
Qty: 21 CAPSULE | Refills: 0 | Status: SHIPPED | OUTPATIENT
Start: 2025-07-25 | End: 2025-08-01

## 2025-07-25 RX ADMIN — KETOROLAC TROMETHAMINE 15 MG: 15 INJECTION, SOLUTION INTRAMUSCULAR; INTRAVENOUS at 01:11

## 2025-07-25 RX ADMIN — KETOROLAC TROMETHAMINE 15 MG: 15 INJECTION, SOLUTION INTRAMUSCULAR; INTRAVENOUS at 06:47

## 2025-07-25 RX ADMIN — GABAPENTIN 300 MG: 300 CAPSULE ORAL at 04:02

## 2025-07-25 RX ADMIN — CHLORHEXIDINE GLUCONATE 15 ML: 1.2 RINSE ORAL at 09:22

## 2025-07-25 RX ADMIN — INSULIN LISPRO 1 UNITS: 100 INJECTION, SOLUTION INTRAVENOUS; SUBCUTANEOUS at 09:23

## 2025-07-25 RX ADMIN — ACETAMINOPHEN 1000 MG: 500 TABLET ORAL at 05:06

## 2025-07-25 RX ADMIN — ENOXAPARIN SODIUM 40 MG: 100 INJECTION SUBCUTANEOUS at 09:23

## 2025-07-25 RX ADMIN — SODIUM CHLORIDE, PRESERVATIVE FREE 2 ML: 5 INJECTION INTRAVENOUS at 09:23

## 2025-07-25 RX ADMIN — OXYCODONE HYDROCHLORIDE 5 MG: 5 TABLET ORAL at 09:22

## 2025-07-25 ASSESSMENT — PAIN SCALES - GENERAL
PAINLEVEL_OUTOF10: 4
PAINLEVEL_OUTOF10: 5
PAINLEVEL_OUTOF10: 4
PAINLEVEL_OUTOF10: 3
PAINLEVEL_OUTOF10: 5
PAINLEVEL_OUTOF10: 2
PAINLEVEL_OUTOF10: 6

## 2025-07-29 LAB
ASR DISCLAIMER: NORMAL
CASE RPRT: NORMAL
CLINICAL INFO: NORMAL
PATH REPORT.FINAL DX SPEC: NORMAL
PATH REPORT.GROSS SPEC: NORMAL

## (undated) DEVICE — SCISSORS LAPSCP 31.75CM 8MM DA VINCI XI HOT SHR EWRST 38D

## (undated) DEVICE — Device

## (undated) DEVICE — SET TBG 3 LUM FLTR AIRSEAL STRL LF ACT CHRCL DISP

## (undated) DEVICE — ELECTRODE PT RTN L9 FT VALLEYLAB REM POLYHESIVE ACRYLIC FOAM

## (undated) DEVICE — APPLICATOR PLASTIC 38CM ARISTA AH FLXTP XL SURG LTWT LONG

## (undated) DEVICE — FORCEPS LAPSCP OD8 MM PROGRASP ENDOWRIST 18USE DA VINCI X XI

## (undated) DEVICE — TIP MNPLT RUMI II OD6.7 MM ARTICULATE LOCK HNDL FLXB DIST

## (undated) DEVICE — DRAPE SURG ADPR ALLY II UPS FEMALE RUMI II ADVINCULA ARCH

## (undated) DEVICE — LUBRICANT INST ELC LUBE BTL PAD STRL FOAM

## (undated) DEVICE — TIP ELECTROCAUTERY HOT SHR DA VINCI ENDOWRIST CAUT MNPLR 8

## (undated) DEVICE — DRAPE EQUIPMENT ARM L21 IN X W19 IN X H10.5 IN DA VINCI XI

## (undated) DEVICE — SYSTEM IMG CLEARIFY 8X6IN WARM HUB TROCAR WIPE MRFBR DISP

## (undated) DEVICE — DRAPE EQUIPMENT CLMN DA VINCI XI

## (undated) DEVICE — SUTURE COAT VICRYL 2-0 SH L27 IN BRAID COAT UNDYED ABS

## (undated) DEVICE — SPONGE GAUZE L4 IN X W4 IN 12 PLY WOVEN FOLD EDGE TYPE VII

## (undated) DEVICE — KIT EPISTAXIS TOPICAL HMST 2 ENT APL MALLEABLE HEMADERM

## (undated) DEVICE — STRIP SKIN CLSR L4 IN X W12 IN REINFORCE STERI-STRIP POLY

## (undated) DEVICE — SUTURE ETHIBOND EXCEL 0 CT-1 L30 IN BRAID NABSB

## (undated) DEVICE — SEAL CANNULA ID5-12 MM NONE

## (undated) DEVICE — GOWN SURG XL L3 NONREINFORCE SET IN SLV STRL LF DISP BLUE

## (undated) DEVICE — DEVICE CLSR L9 IN 0 GS-21 V-LOC 180 TPR PT ABS 2 ANG CUT

## (undated) DEVICE — SUTURE MONOCRYL 4-0 PS-2 L18 IN MONO UNDYED ABS

## (undated) DEVICE — GLOVE SURG 8.5 PROTEXIS PI MIC PWDR FREE SMTH BEAD CUFF

## (undated) DEVICE — DRIVER NDL DA VINCI XI EWRST MEGA 10 USE

## (undated) DEVICE — COVER STAND L55 IN X W29.5 IN REINFORCE CNVRT MAYO TIBURON

## (undated) DEVICE — BAG SPEC RTRVL 25.4CM 15MM TISS RTRVL SYS C1550ML INTRO LF

## (undated) DEVICE — GLOVE SURG 7 BIOGEL SRG LTX STRAW PF BEAD CUFF TXTR SRFC

## (undated) DEVICE — GRASPER LAPSCP 32.91 CM 70 D LONG BP OD8 MM ENDOWRIST DA

## (undated) DEVICE — PLATFORM ACC 10MM 12MM GELPOINT GELSEAL ALEXIS MINI CAP PRTC

## (undated) DEVICE — IRRIGATOR SCT ERG HAND PC STRYKEFLOW II

## (undated) DEVICE — SEALER TISS SLIM JAW XTD DA VINCI

## (undated) DEVICE — TRAY CATH SURESTEP LUBRI-SIL STLK 16FR 350ML FOLEY URMTR

## (undated) DEVICE — PORT HAND ACC 120MM 2 WALL CANNULA BLDLS OPTC TIP LOWPRFL

## (undated) DEVICE — SCISSORS LAPSCP RPSB MNPLR ADTEC METZ 42CM LF 5MM

## (undated) NOTE — ED AVS SNAPSHOT
Esperanza Rachel   MRN: [id number]    Department:  [facility] Emergency Department   Date of Visit:  2/10/2018           Disclosure     Insurance plans vary and the physician(s) referred by the ER may not be covered by your plan.  Please contact your tell this physician (or your personal doctor if your instructions are to return to your personal doctor) about any new or lasting problems. The primary care or specialist physician will see patients referred from the [facility] Emergency Department.  Wanda Red

## (undated) NOTE — Clinical Note
I tried to add on amylase and lipase to old labs, seems they did not do it. Pt will need to have amylase and lipase re drawn now, other labs in 3 mos. Please call pt.

## (undated) NOTE — LETTER
05/22/20        [address] [facility]       Dear Mercedes Better,    Our records indicate that you have outstanding lab work and or testing that was ordered for you and has not yet been completed:  Orders Placed This Encounter

## (undated) NOTE — Clinical Note
Pete Ballard  Slowly improving a1c, not sure when she is seeing you again, would adding jardiance be an option. She has yet to increase the trulicity as you recommended, as she was having some abd discomofrt on the current dose.    Juan Rocha

## (undated) NOTE — LETTER
07/01/19        Alexandra Alpers  [address] [place] [address]      Dear Brent Polk,    Our records indicate that you have outstanding lab work and or testing that was ordered for you and has not yet been completed:  Orders Placed This Encounter

## (undated) NOTE — LETTER
03/11/20        Toby Menendez[address]      Dear Rafia Malone,    Our records indicate that you have outstanding lab work and or testing that was ordered for you and has not yet been completed:  Orders Placed This Encounter

## (undated) NOTE — Clinical Note
Pete Sparks,  Please note BP for this patient. It remained high on repeat and I tried to convince her to start losartan but she said she would prefer to follow up with you. Thanks!   Saskia Gamino

## (undated) NOTE — LETTER
Dear Marie Tillman,      My office staff has attempted to contact you at my request.  It is important for your health and well-being that you contact my office for the following reason(s):      ___X Schedule office visit/office procedure    ___ Schedule laboratory/radiology testing    ___ Call to discuss results of testing and/or be advised of treatment changes      ___      Other:        I believe that the relationship between a physician and their patient is one of communication and mutual cooperation.  It is important for the patient to follow through with the recommendations made by their Doctor in order to achieve the best possible outcome. Please contact our office at [phone].      Sincerely,      [facility]   Sincerely,    Bess Flanagan PA-C

## (undated) NOTE — Clinical Note
Pt did not do labs, call her and make sure she folows up with that and with dm clinic. We will only fill insulin month to month for her, historically non compliant with previous docs, last a1c 12.

## (undated) NOTE — LETTER
12/31/20        [address] [facility]       Dear Tolu Bond,    Our records indicate that you have outstanding lab work and or testing that was ordered for you and has not yet been completed:  Orders Placed This Encounter

## (undated) NOTE — Clinical Note
Pete Mcmanus would like to change from Bentley to dgms/dexcom, can you reach out to her and help get this ordered and trained?   Thanks  Judith Garcia